# Patient Record
Sex: MALE | Race: WHITE | NOT HISPANIC OR LATINO | Employment: OTHER | URBAN - METROPOLITAN AREA
[De-identification: names, ages, dates, MRNs, and addresses within clinical notes are randomized per-mention and may not be internally consistent; named-entity substitution may affect disease eponyms.]

---

## 2023-06-09 ENCOUNTER — TELEPHONE (OUTPATIENT)
Dept: PRIMARY CARE | Facility: CLINIC | Age: 78
End: 2023-06-09
Payer: MEDICARE

## 2023-07-30 ENCOUNTER — HOSPITAL ENCOUNTER (OUTPATIENT)
Dept: DATA CONVERSION | Facility: HOSPITAL | Age: 78
Discharge: HOME | End: 2023-07-30
Attending: EMERGENCY MEDICINE

## 2023-07-30 DIAGNOSIS — Z79.01 LONG TERM (CURRENT) USE OF ANTICOAGULANTS: ICD-10-CM
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
## 2023-07-30 DIAGNOSIS — S69.91XA UNSPECIFIED INJURY OF RIGHT WRIST, HAND AND FINGER(S), INITIAL ENCOUNTER: ICD-10-CM

## 2023-07-30 DIAGNOSIS — Z23 ENCOUNTER FOR IMMUNIZATION: ICD-10-CM

## 2023-07-30 DIAGNOSIS — M25.531 PAIN IN RIGHT WRIST: ICD-10-CM

## 2023-07-30 DIAGNOSIS — S09.90XA UNSPECIFIED INJURY OF HEAD, INITIAL ENCOUNTER: ICD-10-CM

## 2023-07-30 DIAGNOSIS — S63.501A UNSPECIFIED SPRAIN OF RIGHT WRIST, INITIAL ENCOUNTER: Primary | ICD-10-CM

## 2023-07-30 DIAGNOSIS — I48.91 UNSPECIFIED ATRIAL FIBRILLATION (MULTI): ICD-10-CM

## 2023-07-30 LAB
ANION GAP SERPL CALCULATED.3IONS-SCNC: 10 MMOL/L (ref 0–19)
BASOPHILS # BLD AUTO: 0.06 K/UL (ref 0–0.22)
BASOPHILS NFR BLD AUTO: 0.6 % (ref 0–1)
BUN SERPL-MCNC: 18 MG/DL (ref 8–25)
BUN/CREAT SERPL: 20 RATIO (ref 8–21)
CALCIUM SERPL-MCNC: 8.6 MG/DL (ref 8.5–10.4)
CHLORIDE SERPL-SCNC: 105 MMOL/L (ref 97–107)
CO2 SERPL-SCNC: 24 MMOL/L (ref 24–31)
CREAT SERPL-MCNC: 0.9 MG/DL (ref 0.4–1.6)
DEPRECATED RDW RBC AUTO: 48.2 FL (ref 37–54)
DIFFERENTIAL METHOD BLD: ABNORMAL
EOSINOPHIL # BLD AUTO: 0.01 K/UL (ref 0–0.45)
EOSINOPHIL NFR BLD: 0.1 % (ref 0–3)
ERYTHROCYTE [DISTWIDTH] IN BLOOD BY AUTOMATED COUNT: 13.7 % (ref 11.7–15)
GFR SERPL CREATININE-BSD FRML MDRD: 89 ML/MIN/1.73 M2
GLUCOSE SERPL-MCNC: 207 MG/DL (ref 65–99)
HCT VFR BLD AUTO: 41.4 % (ref 41–50)
HGB BLD-MCNC: 14 GM/DL (ref 13.5–16.5)
IMM GRANULOCYTES # BLD AUTO: 0.04 K/UL (ref 0–0.1)
LYMPHOCYTES # BLD AUTO: 1.15 K/UL (ref 1.2–3.2)
LYMPHOCYTES NFR BLD MANUAL: 10.8 % (ref 20–40)
MCH RBC QN AUTO: 32.3 PG (ref 26–34)
MCHC RBC AUTO-ENTMCNC: 33.8 % (ref 31–37)
MCV RBC AUTO: 95.6 FL (ref 80–100)
MONOCYTES # BLD AUTO: 0.68 K/UL (ref 0–0.8)
MONOCYTES NFR BLD MANUAL: 6.4 % (ref 0–8)
NEUTROPHILS # BLD AUTO: 8.72 K/UL
NEUTROPHILS # BLD AUTO: 8.72 K/UL (ref 1.8–7.7)
NEUTROPHILS.IMMATURE NFR BLD: 0.4 % (ref 0–1)
NEUTS SEG NFR BLD: 81.7 % (ref 50–70)
NRBC BLD-RTO: 0 /100 WBC
PLATELET # BLD AUTO: 142 K/UL (ref 150–450)
PMV BLD AUTO: 11.1 CU (ref 7–12.6)
POTASSIUM SERPL-SCNC: 4.5 MMOL/L (ref 3.4–5.1)
RBC # BLD AUTO: 4.33 M/UL (ref 4.5–5.5)
SODIUM SERPL-SCNC: 139 MMOL/L (ref 133–145)
WBC # BLD AUTO: 10.7 K/UL (ref 4.5–11)

## 2023-09-05 ENCOUNTER — OFFICE VISIT (OUTPATIENT)
Dept: PRIMARY CARE | Facility: CLINIC | Age: 78
End: 2023-09-05
Payer: MEDICARE

## 2023-09-05 VITALS
HEIGHT: 72 IN | SYSTOLIC BLOOD PRESSURE: 120 MMHG | OXYGEN SATURATION: 96 % | HEART RATE: 77 BPM | DIASTOLIC BLOOD PRESSURE: 78 MMHG | BODY MASS INDEX: 29.93 KG/M2 | WEIGHT: 221 LBS

## 2023-09-05 DIAGNOSIS — M16.10 PRIMARY OSTEOARTHRITIS OF HIP, UNSPECIFIED LATERALITY: ICD-10-CM

## 2023-09-05 DIAGNOSIS — R29.6 FREQUENT FALLS: ICD-10-CM

## 2023-09-05 DIAGNOSIS — E85.9 AMYLOIDOSIS, UNSPECIFIED TYPE (MULTI): ICD-10-CM

## 2023-09-05 DIAGNOSIS — C43.39 MALIGNANT MELANOMA OF OTHER PARTS OF FACE (MULTI): ICD-10-CM

## 2023-09-05 DIAGNOSIS — I95.1 ORTHOSTATIC HYPOTENSION: ICD-10-CM

## 2023-09-05 DIAGNOSIS — F01.A0 MILD VASCULAR DEMENTIA WITHOUT BEHAVIORAL DISTURBANCE, PSYCHOTIC DISTURBANCE, MOOD DISTURBANCE, OR ANXIETY (MULTI): ICD-10-CM

## 2023-09-05 DIAGNOSIS — R53.81 PHYSICAL DECONDITIONING: Primary | ICD-10-CM

## 2023-09-05 DIAGNOSIS — R41.3 MEMORY CHANGES: ICD-10-CM

## 2023-09-05 PROBLEM — E27.1 ADDISON DISEASE (MULTI): Status: ACTIVE | Noted: 2023-09-05

## 2023-09-05 PROBLEM — E27.1 ADDISON DISEASE (MULTI): Status: RESOLVED | Noted: 2023-09-05 | Resolved: 2023-09-05

## 2023-09-05 PROCEDURE — 1160F RVW MEDS BY RX/DR IN RCRD: CPT | Performed by: STUDENT IN AN ORGANIZED HEALTH CARE EDUCATION/TRAINING PROGRAM

## 2023-09-05 PROCEDURE — 96372 THER/PROPH/DIAG INJ SC/IM: CPT | Performed by: STUDENT IN AN ORGANIZED HEALTH CARE EDUCATION/TRAINING PROGRAM

## 2023-09-05 PROCEDURE — 1157F ADVNC CARE PLAN IN RCRD: CPT | Performed by: STUDENT IN AN ORGANIZED HEALTH CARE EDUCATION/TRAINING PROGRAM

## 2023-09-05 PROCEDURE — 1159F MED LIST DOCD IN RCRD: CPT | Performed by: STUDENT IN AN ORGANIZED HEALTH CARE EDUCATION/TRAINING PROGRAM

## 2023-09-05 PROCEDURE — 1036F TOBACCO NON-USER: CPT | Performed by: STUDENT IN AN ORGANIZED HEALTH CARE EDUCATION/TRAINING PROGRAM

## 2023-09-05 PROCEDURE — 99214 OFFICE O/P EST MOD 30 MIN: CPT | Performed by: STUDENT IN AN ORGANIZED HEALTH CARE EDUCATION/TRAINING PROGRAM

## 2023-09-05 PROCEDURE — 1126F AMNT PAIN NOTED NONE PRSNT: CPT | Performed by: STUDENT IN AN ORGANIZED HEALTH CARE EDUCATION/TRAINING PROGRAM

## 2023-09-05 RX ORDER — MONTELUKAST SODIUM 4 MG/1
TABLET, CHEWABLE ORAL 2 TIMES DAILY
COMMUNITY

## 2023-09-05 RX ORDER — VENLAFAXINE 37.5 MG/1
75 TABLET ORAL DAILY
COMMUNITY

## 2023-09-05 RX ORDER — EZETIMIBE 10 MG/1
1 TABLET ORAL DAILY
COMMUNITY
Start: 2021-11-02

## 2023-09-05 RX ORDER — NAPROXEN 500 MG/1
TABLET ORAL
COMMUNITY

## 2023-09-05 RX ORDER — DONEPEZIL HYDROCHLORIDE 5 MG/1
5 TABLET, FILM COATED ORAL NIGHTLY
Qty: 30 TABLET | Refills: 5 | Status: SHIPPED | OUTPATIENT
Start: 2023-09-05 | End: 2024-03-01

## 2023-09-05 RX ORDER — CEPHALEXIN 500 MG/1
TABLET ORAL
COMMUNITY

## 2023-09-05 RX ORDER — FLUDROCORTISONE ACETATE 0.1 MG/1
1 TABLET ORAL
COMMUNITY
Start: 2022-08-18

## 2023-09-05 RX ORDER — METOPROLOL TARTRATE 25 MG/1
1 TABLET, FILM COATED ORAL DAILY
COMMUNITY
Start: 2022-06-24

## 2023-09-05 RX ORDER — TRIAMCINOLONE ACETONIDE 40 MG/ML
40 INJECTION, SUSPENSION INTRA-ARTICULAR; INTRAMUSCULAR ONCE
Status: COMPLETED | OUTPATIENT
Start: 2023-09-05 | End: 2023-09-05

## 2023-09-05 RX ADMIN — TRIAMCINOLONE ACETONIDE 40 MG: 40 INJECTION, SUSPENSION INTRA-ARTICULAR; INTRAMUSCULAR at 14:13

## 2023-09-05 ASSESSMENT — PATIENT HEALTH QUESTIONNAIRE - PHQ9
2. FEELING DOWN, DEPRESSED OR HOPELESS: NOT AT ALL
1. LITTLE INTEREST OR PLEASURE IN DOING THINGS: NOT AT ALL
SUM OF ALL RESPONSES TO PHQ9 QUESTIONS 1 AND 2: 0

## 2023-09-05 ASSESSMENT — ENCOUNTER SYMPTOMS
GASTROINTESTINAL NEGATIVE: 1
PALPITATIONS: 0
ARTHRALGIAS: 1
SLEEP DISTURBANCE: 0
FATIGUE: 1
RESPIRATORY NEGATIVE: 1

## 2023-09-05 NOTE — PROGRESS NOTES
Subjective   Patient ID: Ciaran Maciel Jr is a 78 y.o. male who presents for Follow-up (Follow up visit/Requesting kenalog shot in hip).    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 Supposed to follow up for yearly image screening but no longer interested.    Recent mechanical fall 7/23, went to ER and no concerning findings. Would like PT referral as wife feels deconditioning playing a role.    Orthostatic hypotension- on florinef, stable and BP have been doing well at home 100-140s. Addisons disease ruled out by endocrine    Review of Systems   Constitutional:  Positive for fatigue.   HENT: Negative.     Eyes:  Negative for visual disturbance.   Respiratory: Negative.     Cardiovascular:  Negative for chest pain and palpitations.   Gastrointestinal: Negative.    Genitourinary: Negative.    Musculoskeletal:  Positive for arthralgias and gait problem.   Psychiatric/Behavioral:  Negative for sleep disturbance.    All other systems reviewed and are negative.      Objective   /78 (BP Location: Left arm, Patient Position: Sitting, BP Cuff Size: Adult)   Pulse 77   Ht 1.829 m (6')   Wt 100 kg (221 lb)   SpO2 96%   BMI 29.97 kg/m²     Physical Exam  Vitals reviewed.   Constitutional:       Appearance: Normal appearance.   HENT:      Head: Normocephalic.      Mouth/Throat:      Mouth: Mucous membranes are moist.   Eyes:      Pupils: Pupils are equal, round, and reactive to light.   Cardiovascular:      Rate and Rhythm: Rhythm irregular.   Pulmonary:      Effort: Pulmonary effort is normal. No respiratory distress.      Breath sounds: Normal breath sounds. No wheezing or rhonchi.   Abdominal:      Palpations: Abdomen is soft.   Musculoskeletal:         General: No tenderness. Normal range of motion.      Right lower leg: No edema.      Left lower leg: No edema.    Skin:     General: Skin is warm and dry.   Neurological:      General: No focal deficit present.      Mental Status: He is alert. Mental status is at baseline.   Psychiatric:         Mood and Affect: Mood normal.         Behavior: Behavior normal.           Current Outpatient Medications:     cephalexin (Keftab) 500 mg tablet, Take by mouth. For Dental procedures only, Disp: , Rfl:     colestipol (Colestid) 1 gram tablet, Take by mouth twice a day., Disp: , Rfl:     ezetimibe (Zetia) 10 mg tablet, Take 1 tablet (10 mg) by mouth once daily., Disp: , Rfl:     fludrocortisone (Florinef) 0.1 mg tablet, Take 1 tablet (0.1 mg) by mouth once daily in the morning. Take before meals., Disp: , Rfl:     metoprolol tartrate (Lopressor) 25 mg tablet, Take 1 tablet (25 mg) by mouth once daily., Disp: , Rfl:     rivaroxaban (Xarelto) 20 mg tablet, Take 1 tablet (20 mg) by mouth once daily., Disp: , Rfl:     venlafaxine (Effexor) 37.5 mg tablet, Take 2 tablets (75 mg) by mouth once daily., Disp: , Rfl:     donepezil (Aricept) 5 mg tablet, Take 1 tablet (5 mg) by mouth once daily at bedtime., Disp: 30 tablet, Rfl: 5    naproxen (Naprosyn) 500 mg tablet, , Disp: , Rfl:   No current facility-administered medications for this visit.      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  Primary osteoarthritis of hip, unspecified laterality  Comments:  40mg kenolog givinay IM in office  Orders:  -     triamcinolone acetonide (Kenalog-40) injection 40 mg  Mild vascular dementia without behavioral disturbance, psychotic disturbance, mood disturbance, or anxiety  (CMS/HCC)  Comments:  controlling risk factors  start low dose aricept  Amyloidosis, unspecified type (CMS/HCC)  Comments:  declined prior cardiac biopsy  was told no options for treatment due to cardiac location  Orthostatic hypotension  Comments:  well controlled on fludricortisone  Assessment and plan to be discussed with attending.   Helen Abdul, DO PGY-2    Follow up in 6 months    I saw and evaluated the patient. I personally obtained the key and critical portions of the history and physical exam or was physically present for key and critical portions performed by the trainee. I reviewed the trainee's documentation and discussed the patient with the trainee. I agree with the trainee's medical decision making, as documented on the trainee's note.      Gerri Sharma, DO

## 2023-09-22 PROBLEM — N52.9 MALE ERECTILE DISORDER: Status: ACTIVE | Noted: 2023-09-22

## 2023-09-22 PROBLEM — I10 BENIGN ESSENTIAL HYPERTENSION: Status: ACTIVE | Noted: 2023-09-22

## 2023-09-22 PROBLEM — M17.9 DJD (DEGENERATIVE JOINT DISEASE) OF KNEE: Status: ACTIVE | Noted: 2023-09-22

## 2023-09-22 PROBLEM — H40.013 OPEN ANGLE WITH BORDERLINE FINDINGS, LOW RISK, BILATERAL: Status: ACTIVE | Noted: 2023-09-22

## 2023-09-22 PROBLEM — R97.20 ELEVATED PSA: Status: ACTIVE | Noted: 2023-09-22

## 2023-09-22 PROBLEM — F43.10 PTSD (POST-TRAUMATIC STRESS DISORDER): Status: ACTIVE | Noted: 2023-09-22

## 2023-09-22 PROBLEM — H52.7 DISORDER OF REFRACTION: Status: ACTIVE | Noted: 2023-09-22

## 2023-09-22 PROBLEM — G47.00 INSOMNIA: Status: ACTIVE | Noted: 2023-09-22

## 2023-09-22 PROBLEM — H25.13 AGE-RELATED NUCLEAR CATARACT, BILATERAL: Status: ACTIVE | Noted: 2023-09-22

## 2023-09-22 PROBLEM — M65.30 ACQUIRED TRIGGER FINGER: Status: ACTIVE | Noted: 2023-09-22

## 2023-09-22 PROBLEM — E66.9 OBESITY (BMI 30-39.9): Status: ACTIVE | Noted: 2023-09-22

## 2023-09-22 PROBLEM — E78.5 HYPERLIPIDEMIA: Status: ACTIVE | Noted: 2023-09-22

## 2023-09-22 PROBLEM — G56.02 CARPAL TUNNEL SYNDROME OF LEFT WRIST: Status: ACTIVE | Noted: 2023-09-22

## 2023-09-22 PROBLEM — I95.1 ORTHOSTATIC SYNCOPE: Status: ACTIVE | Noted: 2023-09-22

## 2023-09-22 PROBLEM — I48.91 ATRIAL FIBRILLATION (MULTI): Status: ACTIVE | Noted: 2023-09-22

## 2023-09-22 PROBLEM — C43.22: Status: ACTIVE | Noted: 2023-09-22

## 2023-09-22 PROBLEM — R40.20 LOSS OF CONSCIOUSNESS (MULTI): Status: ACTIVE | Noted: 2023-09-22

## 2023-09-22 PROBLEM — E55.9 VITAMIN D DEFICIENCY: Status: ACTIVE | Noted: 2023-09-22

## 2023-09-22 PROBLEM — S06.6X9A SUBARACHNOID HEMORRHAGE FOLLOWING INJURY, WITH LOSS OF CONSCIOUSNESS (MULTI): Status: ACTIVE | Noted: 2023-09-22

## 2023-09-22 PROBLEM — I25.10 CAD (CORONARY ARTERY DISEASE): Status: ACTIVE | Noted: 2023-09-22

## 2023-09-22 PROBLEM — I63.9 CVA (CEREBRAL VASCULAR ACCIDENT) (MULTI): Status: ACTIVE | Noted: 2023-09-22

## 2023-09-22 RX ORDER — ASPIRIN 325 MG
325 TABLET ORAL
COMMUNITY

## 2023-09-22 RX ORDER — CLOPIDOGREL BISULFATE 75 MG/1
75 TABLET ORAL
COMMUNITY

## 2023-09-22 RX ORDER — NAPROXEN SODIUM 220 MG/1
3 TABLET ORAL DAILY
COMMUNITY
Start: 2017-05-22

## 2023-09-22 RX ORDER — ACETAMINOPHEN 500 MG
2 TABLET ORAL EVERY 8 HOURS PRN
COMMUNITY

## 2023-09-22 RX ORDER — DOXAZOSIN 4 MG/1
8 TABLET ORAL DAILY
COMMUNITY
Start: 2010-06-11

## 2023-09-22 RX ORDER — CETIRIZINE HYDROCHLORIDE 10 MG/1
1 TABLET ORAL DAILY
COMMUNITY
Start: 2006-10-31

## 2023-09-22 RX ORDER — CHOLECALCIFEROL (VITAMIN D3) 1250 MCG
TABLET ORAL
COMMUNITY

## 2023-09-22 RX ORDER — CHOLECALCIFEROL (VITAMIN D3) 25 MCG
1 TABLET ORAL DAILY
COMMUNITY
Start: 2017-05-22

## 2023-09-22 RX ORDER — MULTIVITAMIN
1 TABLET ORAL DAILY
COMMUNITY
Start: 2010-06-11

## 2023-10-10 ENCOUNTER — EVALUATION (OUTPATIENT)
Dept: PHYSICAL THERAPY | Facility: CLINIC | Age: 78
End: 2023-10-10
Payer: MEDICARE

## 2023-10-10 DIAGNOSIS — R29.6 FREQUENT FALLS: ICD-10-CM

## 2023-10-10 DIAGNOSIS — R53.81 PHYSICAL DECONDITIONING: ICD-10-CM

## 2023-10-10 PROCEDURE — 97161 PT EVAL LOW COMPLEX 20 MIN: CPT | Mod: GP

## 2023-10-10 NOTE — Clinical Note
October 11, 2023     Patient: Ciaran Maciel Jr   YOB: 1945   Date of Visit: 10/10/2023       To Whom it May Concern:    Ciaran Maciel Jr was seen in my clinic on 10/10/2023. He {Return to school/sport:70014}.    If you have any questions or concerns, please don't hesitate to call.         Sincerely,          Loni Mayfield, PT        CC: No Recipients

## 2023-10-10 NOTE — Clinical Note
October 11, 2023     Patient: Ciaran Maciel    YOB: 1945   Date of Visit: 10/10/2023       To Whom It May Concern:    It is my medical opinion that Ciaran Maciel Jr {Work release (duty restriction):56679}.    If you have any questions or concerns, please don't hesitate to call.         Sincerely,        Loni Mayfield, PT    CC: No Recipients

## 2023-10-11 ASSESSMENT — ENCOUNTER SYMPTOMS
DEPRESSION: 0
OCCASIONAL FEELINGS OF UNSTEADINESS: 1
LOSS OF SENSATION IN FEET: 0

## 2023-10-11 NOTE — PROGRESS NOTES
Physical Therapy    Physical Therapy Evaluation    Patient Name: Ciaran Maciel Jr  MRN: [id number]  Today's Date: 10/11/2023       Assessment  PT Assessment Results: Decreased strength, Decreased range of motion, Decreased endurance, Impaired balance, Decreased coordination, Decreased mobility  Rehab Prognosis: Good    pt is a 78 year old M who participated in a physical therapy evaluation today due to Balance, vestibular impairments. His impairments include; balance deficits,  motor control]. Due to these impairments, pt has the following functional limitations  Gait deviations, increased fall risk, difficulty with sleeping, stair negotiation, transfers, performing household/recreational/ occupational activities, and performing ADLs]. Pt will benefit from continued skilled physical therapy to address above impairments and progress toward therapy related goals.       Plan  Treatment/Interventions: Education/ Instruction, Gait training, Manual therapy, Neuromuscular re-education, Therapeutic activities, Therapeutic exercises  PT Plan: Skilled PT  PT Frequency: 1 time per week  Duration: 4 weeks  Onset Date: 10/20/22  Certification Period Start Date: 10/10/23  Certification Period End Date: 01/09/24  Number of Treatments Authorized: [place]  Rehab Potential: Good  Plan of Care Agreement: Patient      Next visit; Assessment for further vestibular hypofunction,       Subjective   General:  General  Reason for Referral: pt reports that he has a hard time with balance, getting up from chairs. . pt reports that they have recently downsized to a smaller home without stairs. Most recent fall- July lead to bruising. pt notes that he is a history of orthostatic hypotension and is taking BP medication. pt notes that he has a long history of vestibular issues as well, and willl need further PT evaluation to help assess for a vestibular hypofunction.  Referred By: Dr Sharma       Prior Function Per Pt/Caregiver Report:  Level of  [place]: Independent with ADLs and functional transfers    Objective        Strength:  Strength Comments: B/L LE 4/5       Stairs:  Stairs Comment: step to pattern     Transfers:  Transfers Comment: requires B/L UE assist to help with sit to stand ransfer  Other:       Outcome Measures:  Other Measures  5x Sit to Stand: 15 seconds  Other Outcome Measures: 4 stage balance   Narrow stance; WFL  Semi tandem: WFL with mild LOB  Tandem: 5, 4 seconds   SL: unable to perform  OP EDUCATION:  Education  Individual(s) Educated: Patient  Education Provided: Anatomy, Fall Risk, Physiology    Goals:  Encounter Problems       Encounter Problems (Active)       PT Problem       PT Goal 1       Start:  10/11/23            PT Goal 2       Start:  10/11/23            PT Goal 3       Start:  10/11/23            PT Goal 4       Start:  10/11/23

## 2023-10-23 ENCOUNTER — TREATMENT (OUTPATIENT)
Dept: PHYSICAL THERAPY | Facility: CLINIC | Age: 78
End: 2023-10-23
Payer: MEDICARE

## 2023-10-23 DIAGNOSIS — R29.6 FREQUENT FALLS: ICD-10-CM

## 2023-10-23 PROCEDURE — 97112 NEUROMUSCULAR REEDUCATION: CPT | Mod: GP | Performed by: PHYSICAL THERAPIST

## 2023-10-23 NOTE — PROGRESS NOTES
"Physical Therapy Treatment    Patient Name: Ciaran Maciel Jr  MRN: [id number]  Today's Date: 10/23/2023  Time Calculation  Start Time: 1135  Stop Time: 1205  Time Calculation (min): 30 min  PT Therapeutic Procedures Time Entry  Neuromuscular Re-Education Time Entry: 30  Visit # 1/4    Current Problem   1. Frequent falls  Follow Up In Physical Therapy          Subjective   General   General Comment: Pt doesnot report any recent falls, most recent in July, he does feel that he is stumbling around and catching himself on objects. He does have a history of stroke, most recent in 2019, since than whenever he looks up he will pass out.  Precautions:  Precautions  Precautions Comment: Falls  Pain    0  Post Treatment Pain Level 0    Objective   Findings: Able to hold tandem and SLS within functional limits without constant need of UE  EC firm surface noted slight shift posterior no LOB     Treatments:  Balance/Neuromuscular Re-Education  Balance/Neuromuscular Re-Education Activity Performed: Yes (Access Code [email or web address])  Balance/Neuromuscular Re-Education Activity 1: NuStep warm up 4' L3  Balance/Neuromuscular Re-Education Activity 2: firm surface 30\" EO; 30\"x3 EC  Balance/Neuromuscular Re-Education Activity 3: firm surface EO with head turns 10x3; EC head turns 10x3  Balance/Neuromuscular Re-Education Activity 4: tandem firm surface 15\" 3x B  Balance/Neuromuscular Re-Education Activity 5: SLS 5\"x2 B UE PRN  Balance/Neuromuscular Re-Education Activity 6: sit to stand 5x2  Balance/Neuromuscular Re-Education Activity 7: low derian fwd/bwd step over 10x2 no UE       Assessment   Assessment:   PT Assessment  Assessment Comment: Pt tolerated session well focus on general balance exercise with good balance noted, no LOB this date. Will continue with progression as tolerated.    Plan:    Continue with balance and strength progression      Goals:   Active       PT Problem       pt will demonstrate 5x sit to  less than " 15 seconds       Start:  10/11/23    Expected End:  01/09/24            pt will demonstrate SLS for > 10 seconds       Start:  10/11/23    Expected End:  01/09/24            pt will demonstrate B/L LE strength 4+/5        Start:  10/11/23    Expected End:  01/09/24            pt will be indep with HEP       Start:  10/11/23    Expected End:  01/09/24

## 2023-10-30 ENCOUNTER — TREATMENT (OUTPATIENT)
Dept: PHYSICAL THERAPY | Facility: CLINIC | Age: 78
End: 2023-10-30
Payer: MEDICARE

## 2023-10-30 DIAGNOSIS — R29.6 FREQUENT FALLS: ICD-10-CM

## 2023-10-30 PROCEDURE — 97112 NEUROMUSCULAR REEDUCATION: CPT | Mod: GP | Performed by: PHYSICAL THERAPIST

## 2023-10-30 ASSESSMENT — PAIN - FUNCTIONAL ASSESSMENT: PAIN_FUNCTIONAL_ASSESSMENT: 0-10

## 2023-10-30 ASSESSMENT — PAIN SCALES - GENERAL: PAINLEVEL_OUTOF10: 0 - NO PAIN

## 2023-10-30 NOTE — PROGRESS NOTES
"Physical Therapy Treatment    Patient Name: Ciaran Maciel Jr  MRN: [id number]  Today's Date: 10/30/2023  Time Calculation  Start Time: 1452  Stop Time: 1530  Time Calculation (min): 38 min  PT Therapeutic Procedures Time Entry  Neuromuscular Re-Education Time Entry: 38  Visit # 2/4    Current Problem   1. Frequent falls  Follow Up In Physical Therapy          Subjective   General   General Comment: Pt does not report any large change. No falls or LOB since last session.  Precautions:  Precautions  Precautions Comment: falls  Pain   Pain Assessment: 0-10  Pain Score: 0 - No pain  Post Treatment Pain Level 0    Objective   Findings: Vestibular screen -   Smooth pursuit: normal findings however slight report of dizziness   Saccades: H +symptoms with slight overshoot; V + symptoms   VOR: H no abnormal findings, slight dizziness however not as bad as smooth pursuit or saccades; V difficulty maintaining gaze stabilization with symptoms     Treatments:  Balance/Neuromuscular Re-Education  Balance/Neuromuscular Re-Education Activity Performed: Yes (Access Code [email or web address])  Balance/Neuromuscular Re-Education Activity 1: NuStep L5 5' no UE  Balance/Neuromuscular Re-Education Activity 2: toe tap @ step 10x3  Balance/Neuromuscular Re-Education Activity 3: squats on airex pad 10x3 no UE  Balance/Neuromuscular Re-Education Activity 4: foam surface romberg EC 30\"x3 no UE  Balance/Neuromuscular Re-Education Activity 5: seated smooth pursuit PB H and V 30\"x3 each  Balance/Neuromuscular Re-Education Activity 6: seated saccades H & V 30\"x3       Assessment   Assessment:   PT Assessment  Assessment Comment: Pt tolerated session well, screen of vestibular system with findings of CNS involvement most liekly due to prior strokes, began vestibular rehab in seated position. Will continue as tolerated.    Plan:    Progress with vestibular system      Goals:   Active       PT Problem       pt will demonstrate 5x sit to  less than " 15 seconds       Start:  10/11/23    Expected End:  01/09/24            pt will demonstrate SLS for > 10 seconds       Start:  10/11/23    Expected End:  01/09/24            pt will demonstrate B/L LE strength 4+/5        Start:  10/11/23    Expected End:  01/09/24            pt will be indep with HEP       Start:  10/11/23    Expected End:  01/09/24

## 2023-11-06 ENCOUNTER — TREATMENT (OUTPATIENT)
Dept: PHYSICAL THERAPY | Facility: CLINIC | Age: 78
End: 2023-11-06
Payer: MEDICARE

## 2023-11-06 DIAGNOSIS — R29.6 FREQUENT FALLS: ICD-10-CM

## 2023-11-06 PROCEDURE — 97112 NEUROMUSCULAR REEDUCATION: CPT | Mod: GP | Performed by: PHYSICAL THERAPIST

## 2023-11-06 NOTE — PROGRESS NOTES
"Physical Therapy Treatment    Patient Name: Ciaran Maciel Jr  MRN: [id number]  Today's Date: 11/6/2023  Time Calculation  Start Time: 0925  Stop Time: 1003  Time Calculation (min): 38 min  PT Therapeutic Procedures Time Entry  Neuromuscular Re-Education Time Entry: 38  Visit # 3/4    Current Problem   1. Frequent falls  Follow Up In Physical Therapy          Subjective   General   General Comment: Pt does not report any new compalints, mod compliance with HEP.  Precautions:  Precautions  Precautions Comment: Falls  Pain    0  Post Treatment Pain Level 0    Objective   Findings: reduced gaze stabilization with smooth pursuit and VORx2 H; V WNL this date     Treatments:  Balance/Neuromuscular Re-Education  Balance/Neuromuscular Re-Education Activity Performed: Yes (Access Code [email or web address])  Balance/Neuromuscular Re-Education Activity 1: seated smooth pursuit PB H and V 30\"x3 each  Balance/Neuromuscular Re-Education Activity 2: seated saccades H 30\"x3  Balance/Neuromuscular Re-Education Activity 3: Seated VOR PB V and H 30\"x3  Balance/Neuromuscular Re-Education Activity 4: seated VORx2 PB H 30\"x3       Assessment   Assessment:   PT Assessment  Assessment Comment: Pt performed progression of seated vestibular noted reduciton in horizontal tasks with reduction in gaze stabilization. Will continue with progression sa tolerated.    Plan:    Continue progression of balance and vestibular     OP EDUCATION:   Continuation of HEP    Goals:   Active       PT Problem       pt will demonstrate 5x sit to  less than 15 seconds (Progressing)       Start:  10/11/23    Expected End:  01/09/24            pt will demonstrate SLS for > 10 seconds (Progressing)       Start:  10/11/23    Expected End:  01/09/24            pt will demonstrate B/L LE strength 4+/5  (Progressing)       Start:  10/11/23    Expected End:  01/09/24            pt will be indep with HEP (Progressing)       Start:  10/11/23    Expected End:  01/09/24    "

## 2023-11-13 ENCOUNTER — APPOINTMENT (OUTPATIENT)
Dept: PHYSICAL THERAPY | Facility: CLINIC | Age: 78
End: 2023-11-13
Payer: MEDICARE

## 2023-11-27 ENCOUNTER — TREATMENT (OUTPATIENT)
Dept: PHYSICAL THERAPY | Facility: CLINIC | Age: 78
End: 2023-11-27
Payer: MEDICARE

## 2023-11-27 DIAGNOSIS — R29.6 FREQUENT FALLS: ICD-10-CM

## 2023-11-27 PROCEDURE — 97112 NEUROMUSCULAR REEDUCATION: CPT | Mod: GP | Performed by: PHYSICAL THERAPIST

## 2023-11-27 ASSESSMENT — PAIN SCALES - GENERAL: PAINLEVEL_OUTOF10: 0 - NO PAIN

## 2023-11-27 ASSESSMENT — PAIN - FUNCTIONAL ASSESSMENT: PAIN_FUNCTIONAL_ASSESSMENT: 0-10

## 2023-11-27 NOTE — PROGRESS NOTES
"Physical Therapy Treatment    Patient Name: Ciaran Maciel Jr  MRN: [id number]  Today's Date: 11/27/2023  Time Calculation  Start Time: 1016  Stop Time: 1054  Time Calculation (min): 38 min  PT Therapeutic Procedures Time Entry  Neuromuscular Re-Education Time Entry: 38  Visit # 4/8    Current Problem   1. Frequent falls  Follow Up In Physical Therapy    Follow Up In Physical Therapy          Subjective   General   General Comment: Pt does not report any new changes, feels a little \"loopy\" today.  Precautions:  Precautions  Precautions Comment: Falls  Pain   Pain Assessment: 0-10  Pain Score: 0 - No pain  Post Treatment Pain Level 0    Objective   Posterior displacement on airex pad     Treatments:  Balance/Neuromuscular Re-Education  Balance/Neuromuscular Re-Education Activity Performed: Yes (Access Code [email or web address])  Balance/Neuromuscular Re-Education Activity 1: nustep warm up 5'  Balance/Neuromuscular Re-Education Activity 2: standing VORx2 BB H 30\"x3  Balance/Neuromuscular Re-Education Activity 3: standing diagonal VOR BB 30\"x3 B  Balance/Neuromuscular Re-Education Activity 4: airex pad normal stance EC 30\"x3 no UE  Balance/Neuromuscular Re-Education Activity 5: firm surface romberg EC with head turns 10x3  Balance/Neuromuscular Re-Education Activity 6: two target with EC 30\"x3       Assessment   Assessment:   PT Assessment  Assessment Comment: While patient has been making improvement he still has posterior displacement during foam surface exercise with only beginning to perform higher level balance tasks this date, pt and PT discussed findings and feel pt would benefit from continued skilled therapy in order to progress with balance tasks.    Plan:   OP PT Plan  PT Frequency: 1 time per week  Duration: 4 weeks    OP EDUCATION:  Outpatient Education  Education Comment: Continue with HEP    Goals:   Active       PT Problem       pt will demonstrate 5x sit to  less than 15 seconds (Progressing)       " Start:  10/11/23    Expected End:  01/09/24            pt will demonstrate SLS for > 10 seconds (Progressing)       Start:  10/11/23    Expected End:  01/09/24            pt will demonstrate B/L LE strength 4+/5  (Progressing)       Start:  10/11/23    Expected End:  01/09/24            pt will be indep with HEP (Progressing)       Start:  10/11/23    Expected End:  01/09/24

## 2023-12-08 ENCOUNTER — TREATMENT (OUTPATIENT)
Dept: PHYSICAL THERAPY | Facility: CLINIC | Age: 78
End: 2023-12-08
Payer: MEDICARE

## 2023-12-08 DIAGNOSIS — R29.6 FREQUENT FALLS: ICD-10-CM

## 2023-12-08 PROCEDURE — 97112 NEUROMUSCULAR REEDUCATION: CPT | Mod: GP | Performed by: PHYSICAL THERAPIST

## 2023-12-08 NOTE — PROGRESS NOTES
"Physical Therapy Treatment    Patient Name: Ciaran Maciel Jr  MRN: [id number]  Today's Date: 12/8/2023  Time Calculation  Start Time: 0840  Stop Time: 0920  Time Calculation (min): 40 min  PT Therapeutic Procedures Time Entry  Neuromuscular Re-Education Time Entry: 40  Visit # 5/8    Current Problem   1. Frequent falls  Follow Up In Physical Therapy          Subjective   General   General Comment: Pt reports he has been very bust at home and with family matters but does not report any issues with balance.  Precautions:  Precautions  Precautions Comment: Falls      Objective   Reduced sway with eyes closed on foam pad     Treatments:  Balance/Neuromuscular Re-Education  Balance/Neuromuscular Re-Education Activity Performed: Yes  Balance/Neuromuscular Re-Education Activity 1: nustep warm up 5'  Balance/Neuromuscular Re-Education Activity 2: airex pad normal stance EC 30\"x3 no UE  Balance/Neuromuscular Re-Education Activity 3: airex pad diagonal VORx1 B 30\"x3  Balance/Neuromuscular Re-Education Activity 4: airex pad smooth pursuit BB H 30\"x3  Balance/Neuromuscular Re-Education Activity 5: airex pad EC with head turns no UE 10x3       Assessment   Assessment:   PT Assessment  Assessment Comment: Pt demonstrated improved balance on airex pad, only one LOB posteriore with Susi, will continue as tolerated.    Plan:    Continue with higher level balance progression         Goals:   Active       PT Problem       pt will demonstrate 5x sit to  less than 15 seconds (Progressing)       Start:  10/11/23    Expected End:  01/09/24            pt will demonstrate SLS for > 10 seconds (Progressing)       Start:  10/11/23    Expected End:  01/09/24            pt will demonstrate B/L LE strength 4+/5  (Progressing)       Start:  10/11/23    Expected End:  01/09/24            pt will be indep with HEP (Progressing)       Start:  10/11/23    Expected End:  01/09/24                 "

## 2023-12-15 ENCOUNTER — TREATMENT (OUTPATIENT)
Dept: PHYSICAL THERAPY | Facility: CLINIC | Age: 78
End: 2023-12-15
Payer: MEDICARE

## 2023-12-15 DIAGNOSIS — R29.6 FREQUENT FALLS: ICD-10-CM

## 2023-12-15 PROCEDURE — 97112 NEUROMUSCULAR REEDUCATION: CPT | Mod: GP | Performed by: PHYSICAL THERAPIST

## 2023-12-15 ASSESSMENT — PAIN - FUNCTIONAL ASSESSMENT: PAIN_FUNCTIONAL_ASSESSMENT: 0-10

## 2023-12-15 ASSESSMENT — PAIN SCALES - GENERAL: PAINLEVEL_OUTOF10: 0 - NO PAIN

## 2023-12-15 NOTE — PROGRESS NOTES
Physical Therapy Treatment    Patient Name: Ciaran Maciel Jr  MRN: [id number]  Today's Date: 12/15/2023  Time Calculation  Start Time: 0850  Stop Time: 0929  Time Calculation (min): 39 min  PT Therapeutic Procedures Time Entry  Neuromuscular Re-Education Time Entry: 39  Visit # 6/8    Current Problem   1. Frequent falls  Follow Up In Physical Therapy          Subjective   General   General Comment: Pt does not report any new complaints from previous session.  Precautions:  Precautions  Precautions Comment: Falls  Pain   Pain Assessment: 0-10  Pain Score: 0 - No pain  Post Treatment Pain Level 0    Objective   No LOB increased sway with tandem     Treatments:  Balance/Neuromuscular Re-Education  Balance/Neuromuscular Re-Education Activity Performed: Yes  Balance/Neuromuscular Re-Education Activity 1: nustep warm up 8'  Balance/Neuromuscular Re-Education Activity 2: firm surface tandem R/L head turns 10x2 B  Balance/Neuromuscular Re-Education Activity 3: foam normal stance EC with head turns 10x3  Balance/Neuromuscular Re-Education Activity 4: airex beam fwd/bwd ambulation 10x  Balance/Neuromuscular Re-Education Activity 5: airex beam lateral steps 10x  Balance/Neuromuscular Re-Education Activity 6: low derian fwd/.bwd 10x       Assessment   Assessment:   PT Assessment  Assessment Comment: Pt tolerated sesion well, no LOB, improvement with higher level tasks. WIll continue as tolerated.    Plan:    Continue with balance tasks   OP EDUCATION:   EHP    Goals:   Active       PT Problem       pt will demonstrate 5x sit to  less than 15 seconds (Progressing)       Start:  10/11/23    Expected End:  01/09/24            pt will demonstrate SLS for > 10 seconds (Progressing)       Start:  10/11/23    Expected End:  01/09/24            pt will demonstrate B/L LE strength 4+/5  (Progressing)       Start:  10/11/23    Expected End:  01/09/24            pt will be indep with HEP (Progressing)       Start:  10/11/23     Expected End:  01/09/24

## 2023-12-18 ENCOUNTER — TREATMENT (OUTPATIENT)
Dept: PHYSICAL THERAPY | Facility: CLINIC | Age: 78
End: 2023-12-18
Payer: MEDICARE

## 2023-12-18 DIAGNOSIS — R29.6 FREQUENT FALLS: ICD-10-CM

## 2023-12-18 PROCEDURE — 97112 NEUROMUSCULAR REEDUCATION: CPT | Mod: GP | Performed by: PHYSICAL THERAPIST

## 2023-12-18 ASSESSMENT — PAIN - FUNCTIONAL ASSESSMENT: PAIN_FUNCTIONAL_ASSESSMENT: 0-10

## 2023-12-18 ASSESSMENT — PAIN SCALES - GENERAL: PAINLEVEL_OUTOF10: 0 - NO PAIN

## 2023-12-18 NOTE — PROGRESS NOTES
Physical Therapy Treatment    Patient Name: Ciaran Maciel Jr  MRN: [id number]  Today's Date: 12/18/2023  Time Calculation  Start Time: 1008  Stop Time: 1035  Time Calculation (min): 27 min  PT Therapeutic Procedures Time Entry  Neuromuscular Re-Education Time Entry: 27  Visit # 7/8    Current Problem   1. Frequent falls  Follow Up In Physical Therapy          Subjective   General   General Comment: Pt does not report any balance issues this date.  Precautions:  Precautions  Precautions Comment: Falls  Pain   Pain Assessment: 0-10  Pain Score: 0 - No pain  Post Treatment Pain Level 0    Objective   No LOB     Treatments:  Balance/Neuromuscular Re-Education  Balance/Neuromuscular Re-Education Activity Performed: Yes  Balance/Neuromuscular Re-Education Activity 1: nustep warm up 8'  Balance/Neuromuscular Re-Education Activity 2: low derian 2 reciprocal fwd/bwd 10x  UE PRN  Balance/Neuromuscular Re-Education Activity 3: low derian 2 lateral step through UE 10x  Balance/Neuromuscular Re-Education Activity 4: airex beam fwd/bwd ambulation 10x  Balance/Neuromuscular Re-Education Activity 5: cone tap 10x3 UE A       Assessment   Assessment:   PT Assessment  Assessment Comment: Pt tolerated session well focus on general balance techniques without any large concern, will perform reassess and potentially DC at next session.    Plan:    Reassess and potential DC next session       Goals:   Active       PT Problem       pt will demonstrate 5x sit to  less than 15 seconds (Progressing)       Start:  10/11/23    Expected End:  01/09/24            pt will demonstrate SLS for > 10 seconds (Progressing)       Start:  10/11/23    Expected End:  01/09/24            pt will demonstrate B/L LE strength 4+/5  (Progressing)       Start:  10/11/23    Expected End:  01/09/24            pt will be indep with HEP (Progressing)       Start:  10/11/23    Expected End:  01/09/24

## 2023-12-29 ENCOUNTER — TREATMENT (OUTPATIENT)
Dept: PHYSICAL THERAPY | Facility: CLINIC | Age: 78
End: 2023-12-29
Payer: MEDICARE

## 2023-12-29 DIAGNOSIS — R29.6 FREQUENT FALLS: ICD-10-CM

## 2023-12-29 PROCEDURE — 97530 THERAPEUTIC ACTIVITIES: CPT | Mod: GP | Performed by: PHYSICAL THERAPIST

## 2023-12-29 ASSESSMENT — BALANCE ASSESSMENTS
TURN 360 DEGREES: ABLE TO TURN 360 DEGREES SAFELY IN 4 SECONDS OR LESS
PLACE ALTERNATE FOOT ON STEP OR STOOL WHILE STANDING UNSUPPORTED: LOOKS BEHIND FROM BOTH SIDES AND WEIGHT SHIFTS WELL
SITTING WITH BACK UNSUPPORTED BUT FEET SUPPORTED ON FLOOR OR ON A STOOL: ABLE TO SIT SAFELY AND SECURELY FOR 2 MINUTES
REACHING FORWARD WITH OUTSTRETCHED ARM WHILE STANDING: CAN REACH FORWARD CONFIDENTLY 25 CM (10 INCHES)
LONG VERSION TOTAL SCORE (MAX 56): 56
STANDING UNSUPPORTED: ABLE TO STAND SAFELY FOR 2 MINUTES
STANDING ON ONE LEG: ABLE TO LIFT LEG INDEPENDENTLY AND HOLD GREATER THAN 10 SECONDS
STANDING UNSUPPORTED ONE FOOT IN FRONT: ABLE TO PLACE FOOT TANDEM INDEPENDENTLY AND HOLD 30 SECONDS
STANDING UNSUPPORTED WITH EYES CLOSED: ABLE TO STAND 10 SECONDS SAFELY
PICK UP OBJECT FROM THE FLOOR FROM A STANDING POSITION: ABLE TO PICK UP SLIPPER SAFELY AND EASILY
STANDING TO SITTING: SITS SAFELY WITH MINIMAL USE OF HANDS
PLACE ALTERNATE FOOT ON STEP OR STOOL WHILE STANDING UNSUPPORTED: ABLE TO STAND INDEPENDENTLY AND SAFELY AND COMPLETE 8 STEPS IN 20 SECONDS
STANDING UNSUPPORTED WITH FEET TOGETHER: ABLE TO PLACE FEET TOGETHER INDEPENDENTLY AND STAND 1 MINUTE SAFELY
TRANSFERS: ABLE TO TRANSFER SAFELY WITH MINOR USE OF HANDS
STANDING TO SITTING: ABLE TO STAND WITHOUT USING HANDS AND STABILIZE INDEPENDENTLY

## 2023-12-29 ASSESSMENT — PAIN SCALES - GENERAL: PAINLEVEL_OUTOF10: 0 - NO PAIN

## 2023-12-29 ASSESSMENT — PAIN - FUNCTIONAL ASSESSMENT: PAIN_FUNCTIONAL_ASSESSMENT: 0-10

## 2023-12-29 NOTE — PROGRESS NOTES
Physical Therapy Discharge    Patient Name: Ciaran Maciel Jr  MRN: [id number]  Today's Date: 12/29/2023  Time Calculation  Start Time: 0845  Stop Time: 0913  Time Calculation (min): 28 min  PT Therapeutic Procedures Time Entry  Therapeutic Activity Time Entry: 28  Visit Discharge    Current Problem   1. Frequent falls  Follow Up In Physical Therapy          Subjective   General   General Comment: Pt does not report any problems or deficits.  Precautions:  Precautions  Precautions Comment: None  Pain   Pain Assessment: 0-10  Pain Score: 0 - No pain  Post Treatment Pain Level 0    Objective   5x STS 13 seconds  TENORIO 56  Vestibular testing normal  Strength WNL    Treatments:  Therapeutic Activity  Therapeutic Activity Performed: Yes  Therapeutic Activity 1: NuStep warm up  Therapeutic Activity 2: reassessment see objective measures       Assessment   Assessment:   PT Assessment  Assessment Comment: Pt has met all goals without any remaining deficits, pt and PT in agreement for DC at this time.    Plan:    DC this date    OP EDUCATION:   Continue with established HEP     Goals:   Resolved       PT Problem       pt will demonstrate 5x sit to  less than 15 seconds (Met)       Start:  10/11/23    Expected End:  01/09/24    Resolved:  12/29/23         pt will demonstrate SLS for > 10 seconds (Met)       Start:  10/11/23    Expected End:  01/09/24    Resolved:  12/29/23         pt will demonstrate B/L LE strength 4+/5  (Met)       Start:  10/11/23    Expected End:  01/09/24    Resolved:  12/29/23         pt will be indep with HEP (Met)       Start:  10/11/23    Expected End:  01/09/24    Resolved:  12/29/23

## 2024-03-01 DIAGNOSIS — R41.3 MEMORY CHANGES: ICD-10-CM

## 2024-03-01 RX ORDER — DONEPEZIL HYDROCHLORIDE 5 MG/1
5 TABLET, FILM COATED ORAL NIGHTLY
Qty: 30 TABLET | Refills: 0 | Status: SHIPPED | OUTPATIENT
Start: 2024-03-01 | End: 2024-04-03

## 2024-03-05 ENCOUNTER — OFFICE VISIT (OUTPATIENT)
Dept: PRIMARY CARE | Facility: CLINIC | Age: 79
End: 2024-03-05
Payer: MEDICARE

## 2024-03-05 VITALS
SYSTOLIC BLOOD PRESSURE: 124 MMHG | BODY MASS INDEX: 30.75 KG/M2 | WEIGHT: 227 LBS | HEART RATE: 91 BPM | OXYGEN SATURATION: 91 % | DIASTOLIC BLOOD PRESSURE: 70 MMHG | HEIGHT: 72 IN

## 2024-03-05 DIAGNOSIS — R39.198 DIFFICULTY IN URINATION: ICD-10-CM

## 2024-03-05 DIAGNOSIS — Z12.5 PROSTATE CANCER SCREENING: ICD-10-CM

## 2024-03-05 DIAGNOSIS — N40.0 BENIGN PROSTATIC HYPERPLASIA WITHOUT LOWER URINARY TRACT SYMPTOMS: ICD-10-CM

## 2024-03-05 DIAGNOSIS — J31.0 CHRONIC RHINITIS: ICD-10-CM

## 2024-03-05 DIAGNOSIS — H61.21 HEARING LOSS OF RIGHT EAR DUE TO CERUMEN IMPACTION: ICD-10-CM

## 2024-03-05 DIAGNOSIS — Z00.00 MEDICARE ANNUAL WELLNESS VISIT, SUBSEQUENT: Primary | ICD-10-CM

## 2024-03-05 PROBLEM — D12.6 TUBULAR ADENOMA OF COLON: Status: ACTIVE | Noted: 2024-03-05

## 2024-03-05 PROBLEM — J30.2 SEASONAL ALLERGIES: Status: ACTIVE | Noted: 2024-03-05

## 2024-03-05 PROBLEM — F43.10 POST-TRAUMA SYNDROME: Status: ACTIVE | Noted: 2024-03-05

## 2024-03-05 PROBLEM — H93.93: Status: ACTIVE | Noted: 2024-03-05

## 2024-03-05 PROBLEM — J30.2 SEASONAL ALLERGIC RHINITIS: Status: ACTIVE | Noted: 2024-03-05

## 2024-03-05 LAB — PSA SERPL-MCNC: 2.65 NG/ML

## 2024-03-05 PROCEDURE — 3074F SYST BP LT 130 MM HG: CPT | Performed by: STUDENT IN AN ORGANIZED HEALTH CARE EDUCATION/TRAINING PROGRAM

## 2024-03-05 PROCEDURE — 1160F RVW MEDS BY RX/DR IN RCRD: CPT | Performed by: STUDENT IN AN ORGANIZED HEALTH CARE EDUCATION/TRAINING PROGRAM

## 2024-03-05 PROCEDURE — 1124F ACP DISCUSS-NO DSCNMKR DOCD: CPT | Performed by: STUDENT IN AN ORGANIZED HEALTH CARE EDUCATION/TRAINING PROGRAM

## 2024-03-05 PROCEDURE — 99214 OFFICE O/P EST MOD 30 MIN: CPT | Performed by: STUDENT IN AN ORGANIZED HEALTH CARE EDUCATION/TRAINING PROGRAM

## 2024-03-05 PROCEDURE — 1170F FXNL STATUS ASSESSED: CPT | Performed by: STUDENT IN AN ORGANIZED HEALTH CARE EDUCATION/TRAINING PROGRAM

## 2024-03-05 PROCEDURE — G0103 PSA SCREENING: HCPCS

## 2024-03-05 PROCEDURE — 1036F TOBACCO NON-USER: CPT | Performed by: STUDENT IN AN ORGANIZED HEALTH CARE EDUCATION/TRAINING PROGRAM

## 2024-03-05 PROCEDURE — 3078F DIAST BP <80 MM HG: CPT | Performed by: STUDENT IN AN ORGANIZED HEALTH CARE EDUCATION/TRAINING PROGRAM

## 2024-03-05 PROCEDURE — 96372 THER/PROPH/DIAG INJ SC/IM: CPT | Performed by: STUDENT IN AN ORGANIZED HEALTH CARE EDUCATION/TRAINING PROGRAM

## 2024-03-05 PROCEDURE — 1157F ADVNC CARE PLAN IN RCRD: CPT | Performed by: STUDENT IN AN ORGANIZED HEALTH CARE EDUCATION/TRAINING PROGRAM

## 2024-03-05 PROCEDURE — 36415 COLL VENOUS BLD VENIPUNCTURE: CPT

## 2024-03-05 PROCEDURE — 1159F MED LIST DOCD IN RCRD: CPT | Performed by: STUDENT IN AN ORGANIZED HEALTH CARE EDUCATION/TRAINING PROGRAM

## 2024-03-05 PROCEDURE — 1126F AMNT PAIN NOTED NONE PRSNT: CPT | Performed by: STUDENT IN AN ORGANIZED HEALTH CARE EDUCATION/TRAINING PROGRAM

## 2024-03-05 PROCEDURE — G0439 PPPS, SUBSEQ VISIT: HCPCS | Performed by: STUDENT IN AN ORGANIZED HEALTH CARE EDUCATION/TRAINING PROGRAM

## 2024-03-05 RX ORDER — TRIAMCINOLONE ACETONIDE 40 MG/ML
40 INJECTION, SUSPENSION INTRA-ARTICULAR; INTRAMUSCULAR ONCE
Status: COMPLETED | OUTPATIENT
Start: 2024-03-05 | End: 2024-03-05

## 2024-03-05 RX ORDER — TAMSULOSIN HYDROCHLORIDE 0.4 MG/1
0.4 CAPSULE ORAL DAILY
Qty: 90 CAPSULE | Refills: 1 | Status: SHIPPED | OUTPATIENT
Start: 2024-03-05 | End: 2025-03-05

## 2024-03-05 RX ADMIN — TRIAMCINOLONE ACETONIDE 40 MG: 40 INJECTION, SUSPENSION INTRA-ARTICULAR; INTRAMUSCULAR at 14:01

## 2024-03-05 ASSESSMENT — ENCOUNTER SYMPTOMS
HEMATURIA: 0
GASTROINTESTINAL NEGATIVE: 1
RHINORRHEA: 1
CARDIOVASCULAR NEGATIVE: 1
FLANK PAIN: 0
DIFFICULTY URINATING: 1
CONSTITUTIONAL NEGATIVE: 1
DYSURIA: 0
PSYCHIATRIC NEGATIVE: 1
NEUROLOGICAL NEGATIVE: 1
RESPIRATORY NEGATIVE: 1

## 2024-03-05 ASSESSMENT — PATIENT HEALTH QUESTIONNAIRE - PHQ9
2. FEELING DOWN, DEPRESSED OR HOPELESS: NOT AT ALL
SUM OF ALL RESPONSES TO PHQ9 QUESTIONS 1 AND 2: 0
1. LITTLE INTEREST OR PLEASURE IN DOING THINGS: NOT AT ALL

## 2024-03-05 ASSESSMENT — ACTIVITIES OF DAILY LIVING (ADL)
DRESSING: INDEPENDENT
DOING_HOUSEWORK: INDEPENDENT
GROCERY_SHOPPING: INDEPENDENT
TAKING_MEDICATION: INDEPENDENT
MANAGING_FINANCES: INDEPENDENT
BATHING: INDEPENDENT

## 2024-03-05 NOTE — PROGRESS NOTES
"Subjective   Patient ID: Ciaran Maciel Jr \"Randolph\" is a 79 y.o. male who presents for Medicare Annual Wellness Visit Subsequent (Medicare annual wellness /Itching in the right ear and runny nose/Trouble finishing urinating - very slow stream/Would like kenalog shot).  Hard time urinating. No issue starting stream, starts and stops. Last PSA 5/2022. No pain when urinating, no blood.     [facility]. first 2 days helped, he will continue taking this and seen.    Right ear itchy.  Has had problems with earwax buildup before.  Has tried home flushes.  Has not tried Debrox.    Nose running.  States gets Kenalog shots twice yearly which helps.  Last 1 was approximately 6 months ago.    Breathes shallow and through his mouth.  Wife and him sleep separate so he is unsure if he snores.    Had one fall over summer because he tripped.  No other falls.  Recovered well.    [facility], reviewed, all unremarkable.        Review of Systems   Constitutional: Negative.    HENT:  Positive for congestion, ear discharge and rhinorrhea.    Respiratory: Negative.     Cardiovascular: Negative.    Gastrointestinal: Negative.    Genitourinary:  Positive for difficulty urinating. Negative for decreased urine volume, dysuria, flank pain, hematuria, penile discharge, penile pain and urgency.   Neurological: Negative.    Psychiatric/Behavioral: Negative.     All other systems reviewed and are negative.      Objective   Physical Exam  Vitals reviewed.   Constitutional:       Appearance: Normal appearance.   Cardiovascular:      Rate and Rhythm: Normal rate and regular rhythm.   Musculoskeletal:         General: Normal range of motion.   Skin:     General: Skin is warm and dry.   Neurological:      General: No focal deficit present.      Mental Status: He is alert. Mental status is at baseline.   Psychiatric:         Mood and Affect: Mood normal.         Behavior: Behavior normal.         Body mass index is 30.79 " kg/m².      Current Outpatient Medications:     cholecalciferol (Vitamin D3) 1,250 mcg (50,000 unit) tablet, Take by mouth every 30 (thirty) days., Disp: , Rfl:     clopidogrel (Plavix) 75 mg tablet, Take 1 tablet (75 mg) by mouth once daily., Disp: , Rfl:     colestipol (Colestid) 1 gram tablet, Take by mouth twice a day., Disp: , Rfl:     donepezil (Aricept) 5 mg tablet, TAKE ONE TABLET BY MOUTH EVERY DAY AT BEDTIME, Disp: 30 tablet, Rfl: 0    doxazosin (Cardura) 4 mg tablet, Take 2 tablets (8 mg) by mouth once daily., Disp: , Rfl:     ezetimibe (Zetia) 10 mg tablet, Take 1 tablet (10 mg) by mouth once daily., Disp: , Rfl:     fludrocortisone (Florinef) 0.1 mg tablet, Take 1 tablet (0.1 mg) by mouth once daily in the morning. Take before meals., Disp: , Rfl:     metoprolol tartrate (Lopressor) 25 mg tablet, Take 1 tablet (25 mg) by mouth once daily., Disp: , Rfl:     multivitamin tablet, Take 1 tablet by mouth once daily., Disp: , Rfl:     naproxen (Naprosyn) 500 mg tablet, , Disp: , Rfl:     rivaroxaban (Xarelto) 20 mg tablet, Take 1 tablet (20 mg) by mouth once daily., Disp: , Rfl:     TRAZODONE HCL ER PO, Take 25 mg by mouth once daily., Disp: , Rfl:     venlafaxine (Effexor) 37.5 mg tablet, Take 2 tablets (75 mg) by mouth once daily., Disp: , Rfl:     acetaminophen (Tylenol) 500 mg tablet, Take 2 tablets (1,000 mg) by mouth every 8 hours if needed (pain)., Disp: , Rfl:     aspirin 325 mg tablet, Take 1 tablet (325 mg) by mouth once daily., Disp: , Rfl:     cephalexin (Keftab) 500 mg tablet, Take by mouth. For Dental procedures only, Disp: , Rfl:     cetirizine (ZyrTEC) 10 mg tablet, Take 1 tablet (10 mg) by mouth once daily., Disp: , Rfl:     cholecalciferol (Vitamin D-3) 25 MCG (1000 UT) tablet, Take 1 tablet (25 mcg) by mouth once daily., Disp: , Rfl:     omega 3-dha-epa-fish oil (Fish OiL) 1,200 (144-216) mg capsule, Take 3 capsules (3,600 mg) by mouth once daily., Disp: , Rfl:     tamsulosin (Flomax) 0.4  mg 24 hr capsule, Take 1 capsule (0.4 mg) by mouth once daily., Disp: 90 capsule, Rfl: 1  No current facility-administered medications for this visit.      Assessment/Plan   Diagnoses and all orders for this visit:  Medicare annual wellness visit, subsequent  Comments:  UTD on vaccines  Prostate cancer screening  -     Prostate Spec.Ag,Screen  Benign prostatic hyperplasia without lower urinary tract symptoms  Comments:  start flomax  check PSA  Orders:  -     tamsulosin (Flomax) 0.4 mg 24 hr capsule; Take 1 capsule (0.4 mg) by mouth once daily.  Chronic rhinitis  -     triamcinolone acetonide (Kenalog-40) injection 40 mg  Hearing loss of right ear due to cerumen impaction  Comments:  recommended debrox at home  can flush in office if needed in future  Difficulty in urination    Follow up in 6 months       Gerri Sharma DO 03/05/24 4:45 PM

## 2024-04-03 DIAGNOSIS — R41.3 MEMORY CHANGES: ICD-10-CM

## 2024-04-03 RX ORDER — DONEPEZIL HYDROCHLORIDE 5 MG/1
5 TABLET, FILM COATED ORAL NIGHTLY
Qty: 30 TABLET | Refills: 0 | Status: SHIPPED | OUTPATIENT
Start: 2024-04-03 | End: 2024-05-02

## 2024-04-10 NOTE — PROGRESS NOTES
"ENT Follow up Visit    History Of Present Illness  Ciaran Maciel Jr \"Randolph\" is a 79 y.o. male presents for follow up      s/p wle left face and SLNB. negative margins and negative nodes. Stage 1b     doing well, has not noticed any new skin lesions or masses in the neck. He has seen dermatology and will be seen every 6 months          Past Medical History  He has a past medical history of Acute embolism and thrombosis of unspecified deep veins of unspecified lower extremity (Multi) (08/23/2013), Body mass index (BMI) 29.0-29.9, adult, Personal history of colonic polyps (05/19/2014), Personal history of other diseases of the digestive system, Personal history of other endocrine, nutritional and metabolic disease, Personal history of other infectious and parasitic diseases, and Rhabdomyolysis (12/13/2017).    Surgical History  He has a past surgical history that includes Elbow surgery (05/19/2014); Other surgical history (05/19/2014); Other surgical history (05/28/2021); Other surgical history (05/28/2021); Other surgical history (05/28/2021); Other surgical history (05/28/2021); Other surgical history (05/28/2021); Tonsillectomy (05/28/2021); Hemorrhoid surgery (05/28/2021); Hernia repair (05/28/2021); Knee arthroscopy w/ debridement (05/28/2021); Other surgical history (11/09/2015); Other surgical history (01/07/2019); MR angio head wo IV contrast (12/28/2022); and MR angio neck wo IV contrast (12/28/2022).     Social History  He reports that he has never smoked. He has never used smokeless tobacco. No history on file for alcohol use and drug use.    Family History  No family history on file.     Allergies  Aspirin-dipyridamole and Statins-hmg-coa reductase inhibitors     Physical Exam:  CONSTITUTIONAL:  No acute distress  VOICE:  No hoarseness or other abnormality  RESPIRATION:  Breathing comfortably, no stridor  CV:  No clubbing/cyanosis/edema in hands  EYES:  EOM intact, sclera normal  NEURO:  Alert and " oriented times 3, Cranial nerves II-XII grossly intact and symmetric bilaterally  HEAD AND FACE:  Symmetric facial features, no masses or lesions, sinuses non-tender to palpation  SALIVARY GLANDS:  Parotid and submandibular glands normal bilaterally  EARS:  Normal external ears, external auditory canals, and TMs to otoscopy, normal hearing to whispered voice.  NOSE:  External nose midline, anterior rhinoscopy is normal with limited visualization to the anterior aspect of the interior turbinates, no bleeding or drainage, no lesions  ORAL CAVITY/OROPHARYNX/LIPS:  Normal mucous membranes, normal floor of mouth/tongue/OP, no masses or lesions  PHARYNGEAL WALLS:  No masses or lesions  NECK/LYMPH:  No LAD, no thyroid masses, trachea midline  SKIN:  prior incisions well healed  PSYCH:  Alert and oriented with appropriate mood and affect         Last Recorded Vitals  Height 1.829 m (6'), weight 104 kg (229 lb 9.6 oz).    Assessment and Plan  79 y.o. malereferred for melanoma with 1.6 mm depth s/p WLE and SLNB (6/2021). Margins and nodes negative. Stage 1b. Close observation recommended at tumor board     -f/u in 9 months  -continue to see dermatology as scheduled  -Follow-up earlier with any new skin lesions or masses in the head neck    Juan Antonio Covarrubias MD

## 2024-04-12 ENCOUNTER — OFFICE VISIT (OUTPATIENT)
Dept: OTOLARYNGOLOGY | Facility: CLINIC | Age: 79
End: 2024-04-12
Payer: MEDICARE

## 2024-04-12 VITALS — WEIGHT: 229.6 LBS | BODY MASS INDEX: 31.1 KG/M2 | HEIGHT: 72 IN

## 2024-04-12 DIAGNOSIS — C43.39 MALIGNANT MELANOMA OF OTHER PARTS OF FACE (MULTI): Primary | ICD-10-CM

## 2024-04-12 PROCEDURE — 1160F RVW MEDS BY RX/DR IN RCRD: CPT | Performed by: OTOLARYNGOLOGY

## 2024-04-12 PROCEDURE — 1159F MED LIST DOCD IN RCRD: CPT | Performed by: OTOLARYNGOLOGY

## 2024-04-12 PROCEDURE — 1036F TOBACCO NON-USER: CPT | Performed by: OTOLARYNGOLOGY

## 2024-04-12 PROCEDURE — 1157F ADVNC CARE PLAN IN RCRD: CPT | Performed by: OTOLARYNGOLOGY

## 2024-04-12 PROCEDURE — 99213 OFFICE O/P EST LOW 20 MIN: CPT | Performed by: OTOLARYNGOLOGY

## 2024-04-12 PROCEDURE — 1125F AMNT PAIN NOTED PAIN PRSNT: CPT | Performed by: OTOLARYNGOLOGY

## 2024-04-12 ASSESSMENT — PAIN SCALES - GENERAL: PAINLEVEL: 6

## 2024-05-02 DIAGNOSIS — R41.3 MEMORY CHANGES: ICD-10-CM

## 2024-05-02 RX ORDER — DONEPEZIL HYDROCHLORIDE 5 MG/1
5 TABLET, FILM COATED ORAL NIGHTLY
Qty: 30 TABLET | Refills: 0 | Status: SHIPPED | OUTPATIENT
Start: 2024-05-02 | End: 2024-06-03

## 2024-06-03 DIAGNOSIS — R41.3 MEMORY CHANGES: ICD-10-CM

## 2024-06-03 RX ORDER — DONEPEZIL HYDROCHLORIDE 5 MG/1
5 TABLET, FILM COATED ORAL NIGHTLY
Qty: 30 TABLET | Refills: 0 | Status: SHIPPED | OUTPATIENT
Start: 2024-06-03

## 2024-07-03 DIAGNOSIS — R41.3 MEMORY CHANGES: ICD-10-CM

## 2024-07-03 RX ORDER — DONEPEZIL HYDROCHLORIDE 5 MG/1
5 TABLET, FILM COATED ORAL NIGHTLY
Qty: 90 TABLET | Refills: 2 | Status: SHIPPED | OUTPATIENT
Start: 2024-07-03

## 2024-07-23 ENCOUNTER — HOSPITAL ENCOUNTER (OUTPATIENT)
Dept: RADIOLOGY | Facility: HOSPITAL | Age: 79
Discharge: HOME | End: 2024-07-23
Payer: MEDICARE

## 2024-07-23 ENCOUNTER — OFFICE VISIT (OUTPATIENT)
Dept: ORTHOPEDIC SURGERY | Facility: HOSPITAL | Age: 79
End: 2024-07-23
Payer: MEDICARE

## 2024-07-23 VITALS — HEIGHT: 72 IN | WEIGHT: 229 LBS | BODY MASS INDEX: 31.02 KG/M2

## 2024-07-23 DIAGNOSIS — G56.02 CARPAL TUNNEL SYNDROME ON LEFT: Primary | ICD-10-CM

## 2024-07-23 DIAGNOSIS — M25.532 LEFT WRIST PAIN: Primary | ICD-10-CM

## 2024-07-23 DIAGNOSIS — M25.532 LEFT WRIST PAIN: ICD-10-CM

## 2024-07-23 DIAGNOSIS — M19.049 CMC ARTHRITIS: ICD-10-CM

## 2024-07-23 PROCEDURE — 73110 X-RAY EXAM OF WRIST: CPT | Mod: LEFT SIDE | Performed by: RADIOLOGY

## 2024-07-23 PROCEDURE — 99213 OFFICE O/P EST LOW 20 MIN: CPT | Performed by: ORTHOPAEDIC SURGERY

## 2024-07-23 PROCEDURE — 1159F MED LIST DOCD IN RCRD: CPT | Performed by: ORTHOPAEDIC SURGERY

## 2024-07-23 PROCEDURE — 1157F ADVNC CARE PLAN IN RCRD: CPT | Performed by: ORTHOPAEDIC SURGERY

## 2024-07-23 PROCEDURE — 1036F TOBACCO NON-USER: CPT | Performed by: ORTHOPAEDIC SURGERY

## 2024-07-23 PROCEDURE — 73110 X-RAY EXAM OF WRIST: CPT | Mod: LT

## 2024-07-23 ASSESSMENT — PAIN - FUNCTIONAL ASSESSMENT: PAIN_FUNCTIONAL_ASSESSMENT: 0-10

## 2024-07-23 ASSESSMENT — PAIN DESCRIPTION - DESCRIPTORS: DESCRIPTORS: ACHING;SORE

## 2024-07-23 ASSESSMENT — PAIN SCALES - GENERAL: PAINLEVEL_OUTOF10: 5 - MODERATE PAIN

## 2024-07-23 NOTE — LETTER
July 23, 2024     Gerri Sharma DO  [address] [facility] [id number]    Patient: Randolph Maciel Jr   YOB: 1945   Date of Visit: 7/23/2024       Dear Dr. Gerri Sharma DO:    Thank you for referring Randolph Maciel Jr to me for evaluation. Below are my notes for this consultation.  If you have questions, please do not hesitate to call me. I look forward to following your patient along with you.       Sincerely,     Estrada Moya MD      CC: No Recipients  ______________________________________________________________________________________    Randolph returns today for his left hand.  In January 2022 I gave him a left ring finger trigger finger release and a left carpal tunnel steroid injection.  He has done very well for a long period of time but about 3 weeks ago his hand became acutely swollen and his carpal tunnel symptoms returned.  This was without any identifiable preceding trauma.  He went to a local urgent care and was discharged home on oral steroid pack and his symptoms resolved fairly quickly but now he is ready to simply just get his carpal tunnel syndrome taken care of.  He is experiencing some pain at the base of the left thumb and he does have a brace to wear for his thumb arthritis.    Past medical history, medications, allergies, surgical history and review of systems have been reviewed with the patient. Pertinent changes are documented in the HPI. Otherwise they are unchanged when compared to last visit on February 15, 2022.    Physical Examination Findings:  Constitutional: Appears well-developed and well-nourished.  Head: Normocephalic and atraumatic.  Eyes: Pupils are equal and round.  Cardiovascular: Intact distal pulses.   Respiratory: Effort normal. No respiratory distress.  Neurologic: Alert and oriented to person, place, and time.  Skin: Skin is warm and dry.  Hematologic / Lymphatic: No lymphedema, lymphangitis.  Psychiatric: normal mood and affect. Behavior is normal.    Musculoskeletal: Left hand examination with mild degenerative changes.  Bony prominence at the base of the thumb.  Positive CMC grind test without instability.  No MP joint instability.  No thenar atrophy.  Healed surgical incision from prior ring finger trigger finger release.  Positive Tinel sign over the transverse carpal ligament.  Positive Laina median nerve compression test.  Diminished sensation median nerve distribution.    X-rays left wrist obtained today demonstrates mild diffuse degenerative changes but with more advanced arthritis at the thumb CMC joint.    Impression: Left carpal tunnel syndrome, left thumb CMC joint arthritis.    Plan: Diagnosis of carpal tunnel syndrome is clear based on symptoms and physical examination findings.  He has also had excellent relief from prior carpal tunnel steroid injections.  He is now ready to proceed with definitive treatment for his carpal tunnel syndrome.  We have discussed management options for his thumb arthritis.  He is requesting injection to be performed at the same time as his carpal tunnel release.  He wants to do this under local anesthesia.  This means he does not have to hold any of his anticoagulation or other medications.    We discussed risks, benefits, alternatives and anticipated post op course including time away from work and activities following surgical treatment for the patient's condition. This is major surgery with identifiable risks. No guarantees for success have been provided. The patient has expressed understanding and has elected to pursue operative treatment.        For Surgical Planning:  Diagnosis: Left carpal tunnel syndrome, left thumb CMC arthritis  Procedure: Left carpal tunnel release, left thumb CMC joint corticosteroid injection  CPT: 96885, 20600  Anesthesia: Local only  Duration: 25 minutes  Special Equipment Needed: None  Medical Notes / PM / DM / PAT needed?:  N/A  Location: [facility]  Initial Post Operative  Visit: 2 weeks Estrada Moya MD    [facility] [employer]  Department of Orthopaedic Surgery  Chief of Hand and Upper Extremity Surgery  [facility]    Dictation performed with the use of voice recognition software. Syntax and grammatical errors may exist.        []

## 2024-07-23 NOTE — PROGRESS NOTES
Randolph returns today for his left hand.  In January 2022 I gave him a left ring finger trigger finger release and a left carpal tunnel steroid injection.  He has done very well for a long period of time but about 3 weeks ago his hand became acutely swollen and his carpal tunnel symptoms returned.  This was without any identifiable preceding trauma.  He went to a local urgent care and was discharged home on oral steroid pack and his symptoms resolved fairly quickly but now he is ready to simply just get his carpal tunnel syndrome taken care of.  He is experiencing some pain at the base of the left thumb and he does have a brace to wear for his thumb arthritis.    Past medical history, medications, allergies, surgical history and review of systems have been reviewed with the patient. Pertinent changes are documented in the HPI. Otherwise they are unchanged when compared to last visit on February 15, 2022.    Physical Examination Findings:  Constitutional: Appears well-developed and well-nourished.  Head: Normocephalic and atraumatic.  Eyes: Pupils are equal and round.  Cardiovascular: Intact distal pulses.   Respiratory: Effort normal. No respiratory distress.  Neurologic: Alert and oriented to person, place, and time.  Skin: Skin is warm and dry.  Hematologic / Lymphatic: No lymphedema, lymphangitis.  Psychiatric: normal mood and affect. Behavior is normal.   Musculoskeletal: Left hand examination with mild degenerative changes.  Bony prominence at the base of the thumb.  Positive CMC grind test without instability.  No MP joint instability.  No thenar atrophy.  Healed surgical incision from prior ring finger trigger finger release.  Positive Tinel sign over the transverse carpal ligament.  Positive Laina median nerve compression test.  Diminished sensation median nerve distribution.    X-rays left wrist obtained today demonstrates mild diffuse degenerative changes but with more advanced arthritis at the thumb CMC  joint.    Impression: Left carpal tunnel syndrome, left thumb CMC joint arthritis.    Plan: Diagnosis of carpal tunnel syndrome is clear based on symptoms and physical examination findings.  He has also had excellent relief from prior carpal tunnel steroid injections.  He is now ready to proceed with definitive treatment for his carpal tunnel syndrome.  We have discussed management options for his thumb arthritis.  He is requesting injection to be performed at the same time as his carpal tunnel release.  He wants to do this under local anesthesia.  This means he does not have to hold any of his anticoagulation or other medications.    We discussed risks, benefits, alternatives and anticipated post op course including time away from work and activities following surgical treatment for the patient's condition. This is major surgery with identifiable risks. No guarantees for success have been provided. The patient has expressed understanding and has elected to pursue operative treatment.        For Surgical Planning:  Diagnosis: Left carpal tunnel syndrome, left thumb CMC arthritis  Procedure: Left carpal tunnel release, left thumb CMC joint corticosteroid injection  CPT: 76482, 76091  Anesthesia: Local only  Duration: 25 minutes  Special Equipment Needed: None  Medical Notes / PM / DM / PAT needed?:  N/A  Location: [place] or [facility]  Initial Post Operative Visit: 2 weeks Estrada Moya MD    [facility] [employer]  Department of Orthopaedic Surgery  Chief of Hand and Upper Extremity Surgery  [facility]    Dictation performed with the use of voice recognition software. Syntax and grammatical errors may exist.        []

## 2024-08-14 ENCOUNTER — TELEPHONE (OUTPATIENT)
Dept: PRIMARY CARE | Facility: CLINIC | Age: 79
End: 2024-08-14
Payer: MEDICARE

## 2024-08-14 DIAGNOSIS — E78.2 MIXED HYPERLIPIDEMIA: Primary | ICD-10-CM

## 2024-08-14 DIAGNOSIS — Z13.29 THYROID DISORDER SCREENING: ICD-10-CM

## 2024-08-15 ENCOUNTER — TELEPHONE (OUTPATIENT)
Dept: PRIMARY CARE | Facility: CLINIC | Age: 79
End: 2024-08-15
Payer: MEDICARE

## 2024-08-15 NOTE — TELEPHONE ENCOUNTER
Lab orders have been placed. Please fast 10-12 hours prior to having them drawn. You can drink water or black coffee.     LVM

## 2024-08-27 ENCOUNTER — LAB (OUTPATIENT)
Dept: LAB | Facility: LAB | Age: 79
End: 2024-08-27
Payer: MEDICARE

## 2024-08-27 DIAGNOSIS — Z13.29 THYROID DISORDER SCREENING: ICD-10-CM

## 2024-08-27 DIAGNOSIS — E78.2 MIXED HYPERLIPIDEMIA: ICD-10-CM

## 2024-08-27 LAB
ALBUMIN SERPL-MCNC: 3.8 G/DL (ref 3.5–5)
ALP BLD-CCNC: 82 U/L (ref 35–125)
ALT SERPL-CCNC: 22 U/L (ref 5–40)
ANION GAP SERPL CALC-SCNC: 12 MMOL/L
AST SERPL-CCNC: 29 U/L (ref 5–40)
BASOPHILS # BLD AUTO: 0.06 X10*3/UL (ref 0–0.1)
BASOPHILS NFR BLD AUTO: 0.9 %
BILIRUB SERPL-MCNC: 0.8 MG/DL (ref 0.1–1.2)
BUN SERPL-MCNC: 15 MG/DL (ref 8–25)
CALCIUM SERPL-MCNC: 8.6 MG/DL (ref 8.5–10.4)
CHLORIDE SERPL-SCNC: 104 MMOL/L (ref 97–107)
CHOLEST SERPL-MCNC: 178 MG/DL (ref 133–200)
CHOLEST/HDLC SERPL: 3.5 {RATIO}
CO2 SERPL-SCNC: 26 MMOL/L (ref 24–31)
CREAT SERPL-MCNC: 0.9 MG/DL (ref 0.4–1.6)
EGFRCR SERPLBLD CKD-EPI 2021: 88 ML/MIN/1.73M*2
EOSINOPHIL # BLD AUTO: 0.28 X10*3/UL (ref 0–0.4)
EOSINOPHIL NFR BLD AUTO: 4.1 %
ERYTHROCYTE [DISTWIDTH] IN BLOOD BY AUTOMATED COUNT: 13.8 % (ref 11.5–14.5)
GLUCOSE SERPL-MCNC: 93 MG/DL (ref 65–99)
HCT VFR BLD AUTO: 40.6 % (ref 41–52)
HDLC SERPL-MCNC: 51 MG/DL
HGB BLD-MCNC: 13.8 G/DL (ref 13.5–17.5)
IMM GRANULOCYTES # BLD AUTO: 0.03 X10*3/UL (ref 0–0.5)
IMM GRANULOCYTES NFR BLD AUTO: 0.4 % (ref 0–0.9)
LDLC SERPL CALC-MCNC: 110 MG/DL (ref 65–130)
LYMPHOCYTES # BLD AUTO: 1.72 X10*3/UL (ref 0.8–3)
LYMPHOCYTES NFR BLD AUTO: 25.4 %
MCH RBC QN AUTO: 32.2 PG (ref 26–34)
MCHC RBC AUTO-ENTMCNC: 34 G/DL (ref 32–36)
MCV RBC AUTO: 95 FL (ref 80–100)
MONOCYTES # BLD AUTO: 0.46 X10*3/UL (ref 0.05–0.8)
MONOCYTES NFR BLD AUTO: 6.8 %
NEUTROPHILS # BLD AUTO: 4.21 X10*3/UL (ref 1.6–5.5)
NEUTROPHILS NFR BLD AUTO: 62.4 %
NRBC BLD-RTO: 0 /100 WBCS (ref 0–0)
PLATELET # BLD AUTO: 167 X10*3/UL (ref 150–450)
POTASSIUM SERPL-SCNC: 3.8 MMOL/L (ref 3.4–5.1)
PROT SERPL-MCNC: 6.1 G/DL (ref 5.9–7.9)
RBC # BLD AUTO: 4.28 X10*6/UL (ref 4.5–5.9)
SODIUM SERPL-SCNC: 142 MMOL/L (ref 133–145)
TRIGL SERPL-MCNC: 83 MG/DL (ref 40–150)
TSH SERPL DL<=0.05 MIU/L-ACNC: 1.36 MIU/L (ref 0.27–4.2)
WBC # BLD AUTO: 6.8 X10*3/UL (ref 4.4–11.3)

## 2024-08-27 PROCEDURE — 36415 COLL VENOUS BLD VENIPUNCTURE: CPT

## 2024-09-05 ENCOUNTER — OFFICE VISIT (OUTPATIENT)
Dept: OPHTHALMOLOGY | Facility: CLINIC | Age: 79
End: 2024-09-05
Payer: MEDICARE

## 2024-09-05 ENCOUNTER — APPOINTMENT (OUTPATIENT)
Dept: OPHTHALMOLOGY | Facility: CLINIC | Age: 79
End: 2024-09-05
Payer: MEDICARE

## 2024-09-05 DIAGNOSIS — H25.13 AGE-RELATED NUCLEAR CATARACT, BILATERAL: Primary | ICD-10-CM

## 2024-09-05 DIAGNOSIS — H52.7 DISORDER OF REFRACTION: ICD-10-CM

## 2024-09-05 DIAGNOSIS — H40.013 OPEN ANGLE WITH BORDERLINE FINDINGS, LOW RISK, BILATERAL: ICD-10-CM

## 2024-09-05 PROCEDURE — 99214 OFFICE O/P EST MOD 30 MIN: CPT | Performed by: OPHTHALMOLOGY

## 2024-09-05 ASSESSMENT — EXTERNAL EXAM - RIGHT EYE: OD_EXAM: NORMAL

## 2024-09-05 ASSESSMENT — REFRACTION_WEARINGRX
OS_SPHERE: +2.75
OD_SPHERE: +2.25
OD_AXIS: 175
OD_ADD: +3.00
OS_AXIS: 178
OS_CYLINDER: -0.25
OD_CYLINDER: -0.50
OS_ADD: +3.00

## 2024-09-05 ASSESSMENT — VISUAL ACUITY
OS_CC: 20/40
OD_CC: 20/40
OS_CC+: -2
CORRECTION_TYPE: GLASSES
METHOD: SNELLEN - LINEAR
OD_CC+: -1

## 2024-09-05 ASSESSMENT — KERATOMETRY
OD_K1POWER_DIOPTERS: 41.75
OS_AXISANGLE2_DEGREES: 85
OD_AXISANGLE_DEGREES: 180
OS_K1POWER_DIOPTERS: 41.75
OD_AXISANGLE2_DEGREES: 90
OS_K2POWER_DIOPTERS: 42.50
OS_AXISANGLE_DEGREES: 175
OD_K2POWER_DIOPTERS: 42.25

## 2024-09-05 ASSESSMENT — ENCOUNTER SYMPTOMS
PSYCHIATRIC NEGATIVE: 0
EYES NEGATIVE: 0
HEMATOLOGIC/LYMPHATIC NEGATIVE: 0
ALLERGIC/IMMUNOLOGIC NEGATIVE: 0
NEUROLOGICAL NEGATIVE: 0
CONSTITUTIONAL NEGATIVE: 0
CARDIOVASCULAR NEGATIVE: 0
MUSCULOSKELETAL NEGATIVE: 0
ENDOCRINE NEGATIVE: 0
GASTROINTESTINAL NEGATIVE: 0
RESPIRATORY NEGATIVE: 0

## 2024-09-05 ASSESSMENT — SLIT LAMP EXAM - LIDS
COMMENTS: NORMAL
COMMENTS: NORMAL

## 2024-09-05 ASSESSMENT — PAIN SCALES - GENERAL: PAINLEVEL: 0-NO PAIN

## 2024-09-05 ASSESSMENT — CUP TO DISC RATIO
OS_RATIO: 0.6
OD_RATIO: 0.5

## 2024-09-05 ASSESSMENT — TONOMETRY
OS_IOP_MMHG: 13
IOP_METHOD: GOLDMANN APPLANATION
OD_IOP_MMHG: 13

## 2024-09-05 ASSESSMENT — EXTERNAL EXAM - LEFT EYE: OS_EXAM: NORMAL

## 2024-09-05 NOTE — PROGRESS NOTES
Assessment/Plan   Problem List Items Addressed This Visit       Age-related nuclear cataract, bilateral - Primary     Non significant cataract noted on exam. Will plan to continue to monitor with serial exam.            Disorder of refraction     Discussed glasses prescription from refraction. Will provide if patient interested in keeping for records or to fill as a new set of glasses.            Open angle with borderline findings, low risk, bilateral     Still consider low risk with prior workup, normal pressures, and no progression on exam since last year. Will continue to monitor with serial exam.             Provided reassurance regarding above diagnoses and care received in the office visit today. Discussed outcomes and options along with the importance of treatment compliance. Understands the importance of any follow up visits. Patient instructed to call/communicate with our office if any new issues, questions, or concerns.     Will plan to see back in 1 year full or sooner PRN

## 2024-09-05 NOTE — ASSESSMENT & PLAN NOTE
Still consider low risk with prior workup, normal pressures, and no progression on exam since last year. Will continue to monitor with serial exam.

## 2024-09-05 NOTE — PATIENT INSTRUCTIONS
Thank you so much for choosing me to provide your care today!    If you were dilated your vision may remain blurry   or light sensitive for several hours.    The nature of eye and vision problems can require frequent follow up, please make every effort to adhere to any future appointments.    If you have any issues, questions, or concerns,   please do not hesitate to reach out.    If you receive a survey in regards to your care today, please mention any exceptional care my office staff and/or technicians provided.    You can reach our office at this number:    [phone]    Please consider signing up for and utilizing [employer]!  This is the best way to directly reach me or other  providers

## 2024-09-06 ENCOUNTER — APPOINTMENT (OUTPATIENT)
Dept: PRIMARY CARE | Facility: CLINIC | Age: 79
End: 2024-09-06
Payer: MEDICARE

## 2024-09-06 VITALS
SYSTOLIC BLOOD PRESSURE: 110 MMHG | BODY MASS INDEX: 31.02 KG/M2 | WEIGHT: 229 LBS | DIASTOLIC BLOOD PRESSURE: 60 MMHG | HEIGHT: 72 IN | OXYGEN SATURATION: 92 % | HEART RATE: 58 BPM

## 2024-09-06 DIAGNOSIS — R06.09 DYSPNEA ON EXERTION: Chronic | ICD-10-CM

## 2024-09-06 DIAGNOSIS — J30.1 SEASONAL ALLERGIC RHINITIS DUE TO POLLEN: ICD-10-CM

## 2024-09-06 DIAGNOSIS — N40.0 BENIGN PROSTATIC HYPERPLASIA WITHOUT LOWER URINARY TRACT SYMPTOMS: Chronic | ICD-10-CM

## 2024-09-06 DIAGNOSIS — I48.91 ATRIAL FIBRILLATION, UNSPECIFIED TYPE (MULTI): Primary | Chronic | ICD-10-CM

## 2024-09-06 DIAGNOSIS — R53.83 OTHER FATIGUE: ICD-10-CM

## 2024-09-06 PROBLEM — E85.9 AMYLOIDOSIS, UNSPECIFIED TYPE (MULTI): Status: ACTIVE | Noted: 2024-09-06

## 2024-09-06 PROBLEM — S06.6X9A SUBARACHNOID HEMORRHAGE FOLLOWING INJURY, WITH LOSS OF CONSCIOUSNESS (MULTI): Status: RESOLVED | Noted: 2023-09-22 | Resolved: 2024-09-06

## 2024-09-06 PROBLEM — F01.A0 MILD VASCULAR DEMENTIA WITHOUT BEHAVIORAL DISTURBANCE, PSYCHOTIC DISTURBANCE, MOOD DISTURBANCE, OR ANXIETY (MULTI): Status: RESOLVED | Noted: 2023-09-05 | Resolved: 2024-09-06

## 2024-09-06 PROBLEM — E85.9 AMYLOIDOSIS, UNSPECIFIED TYPE (MULTI): Status: RESOLVED | Noted: 2024-09-06 | Resolved: 2024-09-06

## 2024-09-06 RX ORDER — MIDODRINE HYDROCHLORIDE 5 MG/1
5 TABLET ORAL 2 TIMES DAILY
COMMUNITY

## 2024-09-06 RX ORDER — TRIAMCINOLONE ACETONIDE 40 MG/ML
40 INJECTION, SUSPENSION INTRA-ARTICULAR; INTRAMUSCULAR ONCE
Status: COMPLETED | OUTPATIENT
Start: 2024-09-06 | End: 2024-09-06

## 2024-09-06 ASSESSMENT — ENCOUNTER SYMPTOMS
MUSCULOSKELETAL NEGATIVE: 1
FATIGUE: 1
PSYCHIATRIC NEGATIVE: 1
APPETITE CHANGE: 0
CHEST TIGHTNESS: 0
ACTIVITY CHANGE: 0
COUGH: 0
SHORTNESS OF BREATH: 1
GASTROINTESTINAL NEGATIVE: 1
NEUROLOGICAL NEGATIVE: 1
RHINORRHEA: 1
CARDIOVASCULAR NEGATIVE: 1

## 2024-09-06 ASSESSMENT — PATIENT HEALTH QUESTIONNAIRE - PHQ9
1. LITTLE INTEREST OR PLEASURE IN DOING THINGS: NOT AT ALL
2. FEELING DOWN, DEPRESSED OR HOPELESS: NOT AT ALL
SUM OF ALL RESPONSES TO PHQ9 QUESTIONS 1 AND 2: 0

## 2024-09-06 NOTE — PROGRESS NOTES
"Subjective   Patient ID: Ciaran Maciel Jr \"Randolph\" is a 79 y.o. male who presents for Follow-up (Follow up /Having a lot of fatigue- had bloodwork done so it could be looked over today/Would like kenalog allergy shot today).  Complains of fatigue. Generally sleeps well at night. States can sleep easily during the day.     Was started on midodrine about 3 months ago. Did not notice much change with energy, BP is doing better.     Had cardiac MRI one year ago. Amyloidosis ruled out.     Chronic dyspnea on exertion. Last echo 2022. Mild ankle swelling.     Uses eclipse machine to move his legs and gets 10k steps with this. Does not do other dedicated exercise.     Chronic a fib stable.     No other concerns today.         Review of Systems   Constitutional:  Positive for fatigue. Negative for activity change and appetite change.   HENT:  Positive for congestion and rhinorrhea.    Respiratory:  Positive for shortness of breath. Negative for cough and chest tightness.    Cardiovascular: Negative.    Gastrointestinal: Negative.    Musculoskeletal: Negative.    Neurological: Negative.    Psychiatric/Behavioral: Negative.     All other systems reviewed and are negative.      Objective   Physical Exam  Vitals reviewed.   Constitutional:       Appearance: Normal appearance.   HENT:      Head: Normocephalic.   Eyes:      Pupils: Pupils are equal, round, and reactive to light.   Cardiovascular:      Rate and Rhythm: Normal rate.   Musculoskeletal:         General: Normal range of motion.   Skin:     General: Skin is warm and dry.   Neurological:      General: No focal deficit present.      Mental Status: He is alert. Mental status is at baseline.   Psychiatric:         Mood and Affect: Mood normal.         Behavior: Behavior normal.         Body mass index is 31.06 kg/m².      Current Outpatient Medications:     acetaminophen (Tylenol) 500 mg tablet, Take 2 tablets (1,000 mg) by mouth every 8 hours if needed (pain)., Disp: " , Rfl:     aspirin 325 mg tablet, Take 1 tablet (325 mg) by mouth once daily., Disp: , Rfl:     cephalexin (Keftab) 500 mg tablet, Take by mouth. For Dental procedures only, Disp: , Rfl:     cetirizine (ZyrTEC) 10 mg tablet, Take 1 tablet (10 mg) by mouth once daily., Disp: , Rfl:     cholecalciferol (Vitamin D-3) 25 MCG (1000 UT) tablet, Take 1 tablet (25 mcg) by mouth once daily., Disp: , Rfl:     cholecalciferol (Vitamin D3) 1,250 mcg (50,000 unit) tablet, Take by mouth every 30 (thirty) days., Disp: , Rfl:     clopidogrel (Plavix) 75 mg tablet, Take 1 tablet (75 mg) by mouth once daily., Disp: , Rfl:     colestipol (Colestid) 1 gram tablet, Take by mouth twice a day., Disp: , Rfl:     donepezil (Aricept) 5 mg tablet, Take 1 tablet (5 mg) by mouth once daily at bedtime., Disp: 90 tablet, Rfl: 2    doxazosin (Cardura) 4 mg tablet, Take 2 tablets (8 mg) by mouth once daily., Disp: , Rfl:     ezetimibe (Zetia) 10 mg tablet, Take 1 tablet (10 mg) by mouth once daily., Disp: , Rfl:     fludrocortisone (Florinef) 0.1 mg tablet, Take 1 tablet (0.1 mg) by mouth once daily in the morning. Take before meals., Disp: , Rfl:     metoprolol tartrate (Lopressor) 25 mg tablet, Take 1 tablet (25 mg) by mouth once daily., Disp: , Rfl:     midodrine (Proamatine) 5 mg tablet, Take 1 tablet (5 mg) by mouth 2 times a day., Disp: , Rfl:     multivitamin tablet, Take 1 tablet by mouth once daily., Disp: , Rfl:     naproxen (Naprosyn) 500 mg tablet, , Disp: , Rfl:     omega 3-dha-epa-fish oil (Fish OiL) 1,200 (144-216) mg capsule, Take 3 capsules (3,600 mg) by mouth once daily., Disp: , Rfl:     rivaroxaban (Xarelto) 20 mg tablet, Take 1 tablet (20 mg) by mouth once daily., Disp: , Rfl:     tamsulosin (Flomax) 0.4 mg 24 hr capsule, Take 1 capsule (0.4 mg) by mouth once daily., Disp: 90 capsule, Rfl: 1    TRAZODONE HCL ER PO, Take 25 mg by mouth once daily., Disp: , Rfl:     venlafaxine (Effexor) 37.5 mg tablet, Take 2 tablets (75 mg) by  mouth once daily., Disp: , Rfl:   No current facility-administered medications for this visit.    Assessment/Plan   Diagnoses and all orders for this visit:  Atrial fibrillation, unspecified type (Multi)  Comments:  stable  Orders:  -     Transthoracic Echo (TTE) Limited; Future  Other fatigue  -     Transthoracic Echo (TTE) Limited; Future  Dyspnea on exertion  Comments:  cardiac MRI one year ago  ruled out amyloidosis  echo ordered  Orders:  -     Transthoracic Echo (TTE) Limited; Future  Seasonal allergic rhinitis due to pollen  Comments:  requesting kenolog shot  Orders:  -     triamcinolone acetonide (Kenalog-40) injection 40 mg  Benign prostatic hyperplasia without lower urinary tract symptoms  Comments:  doing better with flomax    Follow up in 6 months       Gerri Sharma DO 09/06/24 4:41 PM

## 2024-09-09 DIAGNOSIS — N40.0 BENIGN PROSTATIC HYPERPLASIA WITHOUT LOWER URINARY TRACT SYMPTOMS: ICD-10-CM

## 2024-09-09 RX ORDER — TAMSULOSIN HYDROCHLORIDE 0.4 MG/1
0.4 CAPSULE ORAL DAILY
Qty: 90 CAPSULE | Refills: 0 | Status: SHIPPED | OUTPATIENT
Start: 2024-09-09

## 2024-09-20 ENCOUNTER — HOSPITAL ENCOUNTER (OUTPATIENT)
Dept: CARDIOLOGY | Facility: HOSPITAL | Age: 79
Discharge: HOME | End: 2024-09-20
Payer: MEDICARE

## 2024-09-20 ENCOUNTER — LAB (OUTPATIENT)
Dept: LAB | Facility: LAB | Age: 79
End: 2024-09-20
Payer: MEDICARE

## 2024-09-20 DIAGNOSIS — I48.91 ATRIAL FIBRILLATION, UNSPECIFIED TYPE (MULTI): Chronic | ICD-10-CM

## 2024-09-20 DIAGNOSIS — R06.09 DYSPNEA ON EXERTION: Chronic | ICD-10-CM

## 2024-09-20 DIAGNOSIS — R53.83 OTHER FATIGUE: ICD-10-CM

## 2024-09-20 DIAGNOSIS — R94.31 ABNORMAL ELECTROCARDIOGRAM (ECG) (EKG): ICD-10-CM

## 2024-09-20 LAB
AORTIC VALVE MEAN GRADIENT: 3.5 MMHG
AORTIC VALVE PEAK VELOCITY: 1.31 M/S
AV PEAK GRADIENT: 6.9 MMHG
AVA (PEAK VEL): 2.4 CM2
AVA (VTI): 2.68 CM2
EJECTION FRACTION APICAL 4 CHAMBER: 57.9
EJECTION FRACTION: 60 %
LEFT VENTRICLE INTERNAL DIMENSION DIASTOLE: 4.68 CM (ref 3.5–6)
LEFT VENTRICULAR OUTFLOW TRACT DIAMETER: 2.26 CM
LV EJECTION FRACTION BIPLANE: 57 %

## 2024-09-20 PROCEDURE — 93308 TTE F-UP OR LMTD: CPT | Performed by: INTERNAL MEDICINE

## 2024-09-20 PROCEDURE — 93325 DOPPLER ECHO COLOR FLOW MAPG: CPT

## 2024-09-20 PROCEDURE — 93321 DOPPLER ECHO F-UP/LMTD STD: CPT | Performed by: INTERNAL MEDICINE

## 2024-09-20 PROCEDURE — 93325 DOPPLER ECHO COLOR FLOW MAPG: CPT | Performed by: INTERNAL MEDICINE

## 2024-10-02 DIAGNOSIS — G56.02 CARPAL TUNNEL SYNDROME ON LEFT: ICD-10-CM

## 2024-10-09 ENCOUNTER — HOSPITAL ENCOUNTER (OUTPATIENT)
Facility: HOSPITAL | Age: 79
Setting detail: OUTPATIENT SURGERY
Discharge: HOME | End: 2024-10-09
Attending: ORTHOPAEDIC SURGERY | Admitting: ORTHOPAEDIC SURGERY
Payer: MEDICARE

## 2024-10-09 VITALS
BODY MASS INDEX: 30.28 KG/M2 | TEMPERATURE: 97.7 F | HEIGHT: 72 IN | SYSTOLIC BLOOD PRESSURE: 157 MMHG | RESPIRATION RATE: 16 BRPM | WEIGHT: 223.55 LBS | OXYGEN SATURATION: 97 % | HEART RATE: 87 BPM | DIASTOLIC BLOOD PRESSURE: 76 MMHG

## 2024-10-09 DIAGNOSIS — M18.12 ARTHRITIS OF CARPOMETACARPAL (CMC) JOINT OF LEFT THUMB: ICD-10-CM

## 2024-10-09 DIAGNOSIS — G56.02 CARPAL TUNNEL SYNDROME ON LEFT: Primary | ICD-10-CM

## 2024-10-09 PROCEDURE — 3600000003 HC OR TIME - INITIAL BASE CHARGE - PROCEDURE LEVEL THREE: Performed by: ORTHOPAEDIC SURGERY

## 2024-10-09 PROCEDURE — 64721 CARPAL TUNNEL SURGERY: CPT | Performed by: ORTHOPAEDIC SURGERY

## 2024-10-09 PROCEDURE — 2500000004 HC RX 250 GENERAL PHARMACY W/ HCPCS (ALT 636 FOR OP/ED): Performed by: ORTHOPAEDIC SURGERY

## 2024-10-09 PROCEDURE — 7100000010 HC PHASE TWO TIME - EACH INCREMENTAL 1 MINUTE: Performed by: ORTHOPAEDIC SURGERY

## 2024-10-09 PROCEDURE — 3600000008 HC OR TIME - EACH INCREMENTAL 1 MINUTE - PROCEDURE LEVEL THREE: Performed by: ORTHOPAEDIC SURGERY

## 2024-10-09 PROCEDURE — 20600 DRAIN/INJ JOINT/BURSA W/O US: CPT | Performed by: ORTHOPAEDIC SURGERY

## 2024-10-09 PROCEDURE — 7100000009 HC PHASE TWO TIME - INITIAL BASE CHARGE: Performed by: ORTHOPAEDIC SURGERY

## 2024-10-09 RX ORDER — TRIAMCINOLONE ACETONIDE 40 MG/ML
INJECTION, SUSPENSION INTRA-ARTICULAR; INTRAMUSCULAR AS NEEDED
Status: DISCONTINUED | OUTPATIENT
Start: 2024-10-09 | End: 2024-10-09 | Stop reason: HOSPADM

## 2024-10-09 RX ORDER — HYDROCODONE BITARTRATE AND ACETAMINOPHEN 5; 325 MG/1; MG/1
1 TABLET ORAL EVERY 6 HOURS PRN
Qty: 12 TABLET | Refills: 0 | Status: SHIPPED | OUTPATIENT
Start: 2024-10-09 | End: 2024-10-12

## 2024-10-09 RX ORDER — BUPIVACAINE HYDROCHLORIDE 5 MG/ML
INJECTION, SOLUTION PERINEURAL AS NEEDED
Status: DISCONTINUED | OUTPATIENT
Start: 2024-10-09 | End: 2024-10-09 | Stop reason: HOSPADM

## 2024-10-09 RX ORDER — LIDOCAINE HYDROCHLORIDE 10 MG/ML
INJECTION, SOLUTION INFILTRATION; PERINEURAL AS NEEDED
Status: DISCONTINUED | OUTPATIENT
Start: 2024-10-09 | End: 2024-10-09 | Stop reason: HOSPADM

## 2024-10-09 ASSESSMENT — PAIN - FUNCTIONAL ASSESSMENT
PAIN_FUNCTIONAL_ASSESSMENT: 0-10
PAIN_FUNCTIONAL_ASSESSMENT: 0-10

## 2024-10-09 ASSESSMENT — PAIN SCALES - GENERAL
PAINLEVEL_OUTOF10: 0 - NO PAIN
PAINLEVEL_OUTOF10: 0 - NO PAIN

## 2024-10-09 ASSESSMENT — COLUMBIA-SUICIDE SEVERITY RATING SCALE - C-SSRS
1. IN THE PAST MONTH, HAVE YOU WISHED YOU WERE DEAD OR WISHED YOU COULD GO TO SLEEP AND NOT WAKE UP?: NO
2. HAVE YOU ACTUALLY HAD ANY THOUGHTS OF KILLING YOURSELF?: NO

## 2024-10-09 NOTE — H&P
[facility] Department of Orthopaedic Surgery   Surgical History & Physical >30 Days    Reason for Surgery: left carpal tunnel syndrome  Planned Procedure: left carpal tunnel release, left CMC joint corticosteroid injection    History & Physical Reviewed:  Ciaran Maciel Jr is a 79 y.o. male presenting with the above symptoms. This patient was evaluated as an outpatient, and a plan was made for operative management. Risks and benefits were discussed, and the patient and/or caregivers elected to proceed. The patient presents for the above listed procedure today.     Past Medical History:   Diagnosis Date    Acute embolism and thrombosis of unspecified deep veins of unspecified lower extremity (Multi) 08/23/2013    Deep vein thrombosis of lower extremity    Age-related nuclear cataract, bilateral     Body mass index (BMI) 29.0-29.9, adult     BMI 29.0-29.9,adult    Open angle with borderline findings, low risk, bilateral     Personal history of colonic polyps 05/19/2014    History of colonic polyps    Personal history of other diseases of the digestive system     History of hemorrhoids    Personal history of other endocrine, nutritional and metabolic disease     History of [place]'s disease    Personal history of other infectious and parasitic diseases     History of measles    Refractive error     Rhabdomyolysis 12/13/2017    Rhabdomyolysis due to statin therapy     Past Surgical History:   Procedure Laterality Date    ELBOW SURGERY  05/19/2014    Elbow Surgery    HEMORRHOID SURGERY  05/28/2021    Hemorrhoidectomy    HERNIA REPAIR  05/28/2021    Hernia Repair    KNEE ARTHROSCOPY W/ DEBRIDEMENT  05/28/2021    Knee Arthroscopy (Therapeutic)    MR HEAD ANGIO WO IV CONTRAST  12/28/2022    MR HEAD ANGIO WO IV CONTRAST 12/28/2022 [facility]    MR NECK ANGIO WO IV CONTRAST  12/28/2022    MR NECK ANGIO WO IV CONTRAST 12/28/2022 [facility]    OTHER SURGICAL HISTORY  05/19/2014     Interruption Inferior Vena Cava [place] Filter Placement    OTHER SURGICAL HISTORY  05/28/2021    Extensor tendon repair    OTHER SURGICAL HISTORY  05/28/2021    Vasectomy    OTHER SURGICAL HISTORY  05/28/2021    Trigger finger repair    OTHER SURGICAL HISTORY  05/28/2021    Knee replacement    OTHER SURGICAL HISTORY  05/28/2021    Biopsy Muscle    OTHER SURGICAL HISTORY  11/09/2015    Leg Incision And Drainage    OTHER SURGICAL HISTORY  01/07/2019    Colonoscopy    TONSILLECTOMY  05/28/2021    Tonsillectomy     Social History     Tobacco Use    Smoking status: Never    Smokeless tobacco: Never   Substance Use Topics    Alcohol use: Not Currently     Prior to Admission medications    Medication Sig Start Date End Date Taking? Authorizing Provider   acetaminophen (Tylenol) 500 mg tablet Take 2 tablets (1,000 mg) by mouth every 8 hours if needed (pain).    Historical Provider, MD   aspirin 325 mg tablet Take 1 tablet (325 mg) by mouth once daily.    Historical Provider, MD   cephalexin (Keftab) 500 mg tablet Take by mouth. For Dental procedures only    Historical Provider, MD   cetirizine (ZyrTEC) 10 mg tablet Take 1 tablet (10 mg) by mouth once daily. 10/31/06   Historical Provider, MD   cholecalciferol (Vitamin D-3) 25 MCG (1000 UT) tablet Take 1 tablet (25 mcg) by mouth once daily. 5/22/17   Historical Provider, MD   cholecalciferol (Vitamin D3) 1,250 mcg (50,000 unit) tablet Take by mouth every 30 (thirty) days.    Historical Provider, MD   clopidogrel (Plavix) 75 mg tablet Take 1 tablet (75 mg) by mouth once daily.    Historical Provider, MD   colestipol (Colestid) 1 gram tablet Take by mouth twice a day.    Historical Provider, MD   donepezil (Aricept) 5 mg tablet Take 1 tablet (5 mg) by mouth once daily at bedtime. 7/3/24   Amy Johnston, APRN-CNP   doxazosin (Cardura) 4 mg tablet Take 2 tablets (8 mg) by mouth once daily. 6/11/10   Historical Provider, MD   ezetimibe (Zetia) 10 mg tablet Take 1 tablet  (10 mg) by mouth once daily. 11/2/21   Historical Provider, MD   fludrocortisone (Florinef) 0.1 mg tablet Take 1 tablet (0.1 mg) by mouth once daily in the morning. Take before meals. 8/18/22   Historical Provider, MD   metoprolol tartrate (Lopressor) 25 mg tablet Take 1 tablet (25 mg) by mouth once daily. 6/24/22   Historical Provider, MD   midodrine (Proamatine) 5 mg tablet Take 1 tablet (5 mg) by mouth 2 times a day.    Historical Provider, MD   multivitamin tablet Take 1 tablet by mouth once daily. 6/11/10   Historical Provider, MD   naproxen (Naprosyn) 500 mg tablet     Historical Provider, MD   omega 3-dha-epa-fish oil (Fish OiL) 1,200 (144-216) mg capsule Take 3 capsules (3,600 mg) by mouth once daily. 5/22/17   Historical Provider, MD   rivaroxaban (Xarelto) 20 mg tablet Take 1 tablet (20 mg) by mouth once daily.    Historical Provider, MD   tamsulosin (Flomax) 0.4 mg 24 hr capsule TAKE ONE CAPSULE BY MOUTH ONCE DAILY 9/9/24   Gerri Sharma, DO   TRAZODONE HCL ER PO Take 25 mg by mouth once daily.    Historical Provider, MD   venlafaxine (Effexor) 37.5 mg tablet Take 2 tablets (75 mg) by mouth once daily.    Historical Provider, MD     Allergies   Allergen Reactions    Aspirin-Dipyridamole Other    Statins-Hmg-Coa Reductase Inhibitors Other and Myalgia       Review of Systems:   Gen: Denies recent weight loss  Neuro: Denies recent confusion  Ophtho: Denies changes in vision  ENT: Denies changes in hearing  Endo: Denies weight loss/weight gain  CV: Denies chest pain  Resp: Denies shortness of breath  GI: Denies melena/hematochezia  : Denies painful urination  MSK: Per above HPI  Heme: No abnormal bleeding  Psych: Denies hallucinations    Physical Exam:  - Constitutional: No acute distress, cooperative  - Eyes: EOM grossly intact  - Head/Neck: Trachea midline  - Respiratory/Thorax: Normal work of breathing  - Cardiovascular: RRR on peripheral palpation  - Gastrointestinal: Nondistended  - Psychological:  Appropriate mood/behavior  - Skin: Warm and dry. Additional findings in musculoskeletal evaluation  - Musculoskeletal: Moves all extremities    ERAS patient?: No    COVID-19 Risk Consent:   Surgeon has reviewed the key risks related to aminta COVID-19 and subsequent sequelae.       Chino Alcazar MD   Orthopaedic Surgery PGY-2

## 2024-10-09 NOTE — DISCHARGE INSTRUCTIONS
Post-Operative Instructions  Dr. Estrada Moya [phone]    Dressing:  You have a bandage or splint covering your operative site.    You may remove the dressing in 4  days following surgery. Once your dressing is removed you may shower and/or wash your incision in a sink with soap and water. Do not soak your incision. No bath tubs, hot tubs or swimming pools. After washing the wound please pat the incision dry thoroughly. Please use mild soap. Please do not use hydrogen peroxide. Please cover the wound with a band-aid or gauze and change it daily. Please do not apply any salves, creams, lotions or ointments to the surgical incision. Otherwise keep incision clean and dry (no yard work, engine work, etc.)    Post Anesthesia Instructions:  If you received general anesthesia or IV sedation for your procedure for the next 24 hours: No driving, no drinking alcohol, no sleeping aids, no important decision making, and have an adult with you for 24 hours.    Showering:  You may shower following surgery but please adhere to above instructions regarding the dressing. If showering with bandage/splint in place please ensure that it is kept dry and covered while bathing. There are commercially available cast bags that can be used to protect your dressing while showering. If using garbage bags please make sure that there are no holes in the bag and that the bag has been sealed above the dressing. If the bandage gets wet you must contact your surgeon's office to make arrangements to be seen to have the bandage changed.     Ice/Elevation:  The application of ice to your surgical site after surgery will help with pain control and swelling. This can be very effective for 48-72 hours after surgery. Please be careful to avoid getting bandage wet from a leaky ice bag. Please be advised that the ice may need to be applied for longer periods of time for the cooling effect to penetrate the post-operative dressing.     Elevation of the  operative site above the level of the heart as much as possible for the first 48-72 hours after surgery. Use your sling if you have been provided one while standing or walking. If your fingers are not included in the dressing you are encouraged to attempt finger range of motion as this will help with your hand swelling and ultimate recovery.    Pain Medication:  If you received a regional anesthetic on the day of your surgery your arm and hand may be numb for up to 24 hours after surgery. It is important to wear your sling while the block is still effective in order to protect your arm. It is advisable to take pain medications prior to going to sleep in case the regional anesthesia medication wears off while you are sleeping.    Take your pain medications as directed. Narcotic pain medications can cause lethargy, nausea and constipation. Please contact your surgeon's office and discontinue the medication if these symptoms become problematic. Eating a regular diet, drinking fluids and walking can help with constipation from these medications. Avoid alcohol consumption and driving while taking narcotic pain medications.     Additional pain control options:     You are encouraged to take over the counter medications like Advil / Motrin / Ibuprofen / Aleve in addition to your prescribed pain medications after surgery.    Smoking/Tobacco:  Tobacco use is known to interfere with wound and fracture healing and increase post-operative pain. It can also increase your risk of poor outcomes following surgery. Please do not use tobacco or nicotine products following your surgery. This includes smokeless tobacco, or nicotine replacement products (patches, gum, etc.).    Driving:  It is not advisable to operate a vehicle while using narcotic pain medications. Additionally, please be advised that you are likely to have challenges operating a car or motorcycle while you are still in your postoperative dressing and this could  increase your risk of being involved in an accident and being cited for driving while physically impaired.     Warning Signs:  Observe your arm/hand and incision site (if visible) for increased redness, inflammation, drainage, odor or pain that is unrelieved by rest, elevation or medication. Please contact your surgeon's office immediately if you develop any of these issues or if you develop a fever greater than 101°.    Follow Up Appointments:  Your post-operative appointment has been scheduled for 10/24/24 at 10:30 am    [facility], [address], [place], [place], [address]

## 2024-10-09 NOTE — OP NOTE
"Left carpal tunnel release, left thumb CMC joint corticosteroid injection / 25 Minutes (L) Operative Note     Date: 10/9/2024  OR Location: [facility]    Name: Ciaran Maciel Jr \"Randolph\", : 1945, Age: 79 y.o., MRN: [id number], Sex: male    Diagnosis  Pre-op Diagnosis      * Carpal tunnel syndrome on left [G56.02]     * Arthritis of carpometacarpal (CMC) joint of left thumb [M18.12] Post-op Diagnosis     * Carpal tunnel syndrome on left [G56.02]     * Arthritis of carpometacarpal (CMC) joint of left thumb [M18.12]     Procedures  #1 left carpal tunnel release, #2 left thumb CMC joint corticosteroid injection    Surgeons      * Estrada Moya - Primary    Resident/Fellow/Other Assistant:  Surgeons and Role:  * No surgeons found with a matching role *    Procedure Summary  Anesthesia: Anesthesia type not filed in the log.  ASA: ASA status not filed in the log.  Anesthesia Staff: No anesthesia staff entered.  Estimated Blood Loss: 0 mL  Intra-op Medications:   Administrations occurring from 1245 to 1345 on 10/09/24:   Medication Name Total Dose   lidocaine (Xylocaine) 10 mg/mL (1 %) injection 5 mL   BUPivacaine HCl (Marcaine) 0.5 % (5 mg/mL) injection 5 mL   triamcinolone acetonide (Kenalog-40) injection 20 mg   BUPivacaine HCl (Marcaine) 0.5 % (5 mg/mL) injection 0.5 mL         Intraprocedure I/O Totals       None           Specimen: No specimens collected     Staff:   Circulator: Ying Vick Person: Johanne  Circulator: Margot         Drains and/or Catheters: * None in log *    Tourniquet Times:     Total Tourniquet Time Documented:  Arm - Upper (Left) - 5 minutes  Total: Arm - Upper (Left) - 5 minutes      Implants:     Findings: Severe carpal tunnel syndrome, left thumb CMC arthritis    Indications: Randolph Maciel Jr is an 79 y.o. male who is having surgery for Carpal tunnel syndrome on left [G56.02].      The patient was seen in the preoperative area. The risks, benefits, complications, treatment options, " non-operative alternatives, expected recovery and outcomes were discussed with the patient. The possibilities of reaction to medication, pulmonary aspiration, injury to surrounding structures, bleeding, recurrent infection, the need for additional procedures, failure to diagnose a condition, and creating a complication requiring transfusion or operation were discussed with the patient. The patient concurred with the proposed plan, giving informed consent.  The site of surgery was properly noted/marked if necessary per policy. The patient has been actively warmed in preoperative area. Preoperative antibiotics are not indicated. Venous thrombosis prophylaxis are not indicated.    Procedure Details:    79-year-old gentleman with longstanding symptomatic arthritis of the left thumb CMC joint and carpal tunnel syndrome presents today for left carpal tunnel release and thumb CMC joint corticosteroid injection.  Preoperatively both sites were identified and marked for surgery.  Informed consent process was completed.    Patient was brought to the operating and placed supine on the operating table.  A timeout procedure was performed to verify the correct patient procedure and operative site.  Using aseptic technique the left thumb CMC joint was injected with 20 mg of triamcinolone and 1/2 cc of 0.5% Marcaine.  Local anesthetic was then infiltrated around the base of the left palm.  Left upper extremity was prepped and draped in usual sterile fashion.  Limb was exsanguinated and tourniquet was inflated to 250 mmHg.    We made a longitudinal incision the central aspect of the proximal palm.  Sharp dissection carried through the superficial palmar fascia.  Transverse carpal ligament was exposed and divided longitudinally along its ulnar margin.  Complete median nerve decompression was confirmed.  Nerve hyperemia was very clearly demonstrated following nerve decompression.  Wound was irrigated and closed interrupted fashion.  A  sterile bandage was applied and the tourniquet was deflated.  The patient was transferred to the recovery in stable condition.    Postoperatively the patient will be discharged home once comfortable.  He can remove his bandage and postop day #4 and begin wound care with gentle activities as instructed.  Return to clinic in 2 weeks for wound check and advancement of activities.        Complications:  None; patient tolerated the procedure well.    Disposition: PACU - hemodynamically stable.  Condition: stable         Additional Details:      Attending Attestation: I was present and scrubbed for the entire procedure.    Estrada Moya  Phone Number: [phone]

## 2024-10-10 ASSESSMENT — PAIN SCALES - GENERAL: PAINLEVEL_OUTOF10: 3

## 2024-10-23 NOTE — PROGRESS NOTES
Hand and Upper Extremity Service  Post Operative visit         Date of surgery: 10/9/24    Surgery(s) performed: Left carpal tunnel release        Subjective report: First postoperative visit. Overall doing great and feels much better than before surgery.        Exam findings: Left hand reveals well healed surgical incision proximal palm. Resolving echymosis in the forearm and dorsal hand. Full digital range of motion. Sensation intact to light touch.        Radiograph findings: No new images obtained       Impression: Left carpal tunnel syndrome and thumb CMC arthritis       Plan: He's familiar with the postoperative course after going through the same procedure on the right hand. I have no restrictions for him. He can progressively return to activities as tolerated.         Follow Up: As needed            Estrada Moya MD    [facility] [employer]  Department of Orthopaedic Surgery  Chief of Hand and Upper Extremity Surgery      Scribe Attestation  By signing my name below, IMilvia Scribe   attest that this documentation has been prepared under the direction and in the presence of Dr. Estrada Moya.      Dictation performed with the use of voice recognition software. Syntax and grammatical errors may exist.

## 2024-10-24 ENCOUNTER — OFFICE VISIT (OUTPATIENT)
Dept: ORTHOPEDIC SURGERY | Facility: CLINIC | Age: 79
End: 2024-10-24
Payer: MEDICARE

## 2024-10-24 VITALS — BODY MASS INDEX: 30.2 KG/M2 | HEIGHT: 72 IN | WEIGHT: 223 LBS

## 2024-10-24 DIAGNOSIS — G56.02 CARPAL TUNNEL SYNDROME ON LEFT: Primary | ICD-10-CM

## 2024-10-24 PROCEDURE — 1159F MED LIST DOCD IN RCRD: CPT | Performed by: ORTHOPAEDIC SURGERY

## 2024-10-24 PROCEDURE — 1157F ADVNC CARE PLAN IN RCRD: CPT | Performed by: ORTHOPAEDIC SURGERY

## 2024-10-24 PROCEDURE — 1036F TOBACCO NON-USER: CPT | Performed by: ORTHOPAEDIC SURGERY

## 2024-10-24 PROCEDURE — 99211 OFF/OP EST MAY X REQ PHY/QHP: CPT | Performed by: ORTHOPAEDIC SURGERY

## 2024-10-24 PROCEDURE — 1125F AMNT PAIN NOTED PAIN PRSNT: CPT | Performed by: ORTHOPAEDIC SURGERY

## 2024-10-24 ASSESSMENT — PAIN SCALES - GENERAL: PAINLEVEL_OUTOF10: 3

## 2024-10-24 ASSESSMENT — PAIN DESCRIPTION - DESCRIPTORS: DESCRIPTORS: SORE

## 2024-10-24 ASSESSMENT — PAIN - FUNCTIONAL ASSESSMENT: PAIN_FUNCTIONAL_ASSESSMENT: 0-10

## 2024-12-16 DIAGNOSIS — N40.0 BENIGN PROSTATIC HYPERPLASIA WITHOUT LOWER URINARY TRACT SYMPTOMS: ICD-10-CM

## 2024-12-16 RX ORDER — TAMSULOSIN HYDROCHLORIDE 0.4 MG/1
0.4 CAPSULE ORAL DAILY
Qty: 90 CAPSULE | Refills: 1 | Status: SHIPPED | OUTPATIENT
Start: 2024-12-16

## 2025-01-24 ENCOUNTER — APPOINTMENT (OUTPATIENT)
Dept: OTOLARYNGOLOGY | Facility: CLINIC | Age: 80
End: 2025-01-24
Payer: MEDICARE

## 2025-02-07 ENCOUNTER — APPOINTMENT (OUTPATIENT)
Facility: CLINIC | Age: 80
End: 2025-02-07
Payer: MEDICARE

## 2025-02-07 VITALS — BODY MASS INDEX: 31.56 KG/M2 | WEIGHT: 233 LBS | HEIGHT: 72 IN

## 2025-02-07 DIAGNOSIS — C43.39 MALIGNANT MELANOMA OF OTHER PARTS OF FACE (MULTI): Primary | ICD-10-CM

## 2025-02-07 PROCEDURE — 1126F AMNT PAIN NOTED NONE PRSNT: CPT | Performed by: OTOLARYNGOLOGY

## 2025-02-07 PROCEDURE — 1157F ADVNC CARE PLAN IN RCRD: CPT | Performed by: OTOLARYNGOLOGY

## 2025-02-07 PROCEDURE — 1159F MED LIST DOCD IN RCRD: CPT | Performed by: OTOLARYNGOLOGY

## 2025-02-07 PROCEDURE — 99213 OFFICE O/P EST LOW 20 MIN: CPT | Performed by: OTOLARYNGOLOGY

## 2025-02-07 PROCEDURE — 1160F RVW MEDS BY RX/DR IN RCRD: CPT | Performed by: OTOLARYNGOLOGY

## 2025-02-07 PROCEDURE — 1036F TOBACCO NON-USER: CPT | Performed by: OTOLARYNGOLOGY

## 2025-02-07 ASSESSMENT — PAIN SCALES - GENERAL: PAINLEVEL_OUTOF10: 0-NO PAIN

## 2025-02-07 NOTE — PROGRESS NOTES
"ENT Follow up Visit    History Of Present Illness  Ciaran Maciel Jr \"Randolph\" is a 80 y.o. male presents for follow up      s/p wle left face and SLNB. negative margins and negative nodes. Stage 1b     doing well, has not noticed any new skin lesions or masses in the neck. He has seen dermatology and will be seen every 6 months    He did notice a small firm area on the inside of his lower lip on the left-hand side.  Is not causing the discomfort.  It tends to fluctuate in size     Past Medical History  He has a past medical history of Acute embolism and thrombosis of unspecified deep veins of unspecified lower extremity (Multi) (08/23/2013), Age-related nuclear cataract, bilateral, Body mass index (BMI) 29.0-29.9, adult, Open angle with borderline findings, low risk, bilateral, Personal history of colonic polyps (05/19/2014), Personal history of other diseases of the digestive system, Personal history of other endocrine, nutritional and metabolic disease, Personal history of other infectious and parasitic diseases, Refractive error, and Rhabdomyolysis (12/13/2017).    Surgical History  He has a past surgical history that includes Elbow surgery (05/19/2014); Other surgical history (05/19/2014); Other surgical history (05/28/2021); Other surgical history (05/28/2021); Other surgical history (05/28/2021); Other surgical history (05/28/2021); Other surgical history (05/28/2021); Tonsillectomy (05/28/2021); Hemorrhoid surgery (05/28/2021); Hernia repair (05/28/2021); Knee arthroscopy w/ debridement (05/28/2021); Other surgical history (11/09/2015); Other surgical history (01/07/2019); MR angio head wo IV contrast (12/28/2022); and MR angio neck wo IV contrast (12/28/2022).     Social History  He reports that he has never smoked. He has never used smokeless tobacco. He reports that he does not currently use alcohol. He reports that he does not use drugs.    Family History  No family history on file.   "   Allergies  Aspirin-dipyridamole and Statins-hmg-coa reductase inhibitors     Physical Exam:  CONSTITUTIONAL:  No acute distress  VOICE:  No hoarseness or other abnormality  RESPIRATION:  Breathing comfortably, no stridor  CV:  No clubbing/cyanosis/edema in hands  EYES:  EOM intact, sclera normal  NEURO:  Alert and oriented times 3, Cranial nerves II-XII grossly intact and symmetric bilaterally  HEAD AND FACE:  Symmetric facial features, no masses or lesions, sinuses non-tender to palpation  SALIVARY GLANDS:  Parotid and submandibular glands normal bilaterally  EARS:  Normal external ears, external auditory canals, and TMs to otoscopy, normal hearing to whispered voice.  NOSE:  External nose midline, anterior rhinoscopy is normal with limited visualization to the anterior aspect of the interior turbinates, no bleeding or drainage, no lesions  ORAL CAVITY/OROPHARYNX/LIPS:  Normal mucous membranes, normal floor of mouth/tongue/OP, there are no mucosal changes.  There is a small mobile mass which clinically looks like a slightly enlarged minor salivary gland on the left lower lip  PHARYNGEAL WALLS:  No masses or lesions  NECK/LYMPH:  No LAD, no thyroid masses, trachea midline  SKIN:  prior incisions well healed  PSYCH:  Alert and oriented with appropriate mood and affect         Last Recorded Vitals  Height 1.829 m (6'), weight 106 kg (233 lb).    Assessment and Plan  80 y.o. malereferred for melanoma with 1.6 mm depth s/p WLE and SLNB (6/2021). Margins and nodes negative. Stage 1b. Close observation recommended at tumor board     -f/u in 1 year  -continue to see dermatology as scheduled  -Follow-up earlier with any new skin lesions or masses in the head neck.  We also discussed that he should follow-up earlier if he notices any pain, increase in size, or any new symptoms with the left lower lip    Juan Antonio Covarrubias MD

## 2025-02-26 ENCOUNTER — PATIENT MESSAGE (OUTPATIENT)
Dept: PRIMARY CARE | Facility: CLINIC | Age: 80
End: 2025-02-26
Payer: MEDICARE

## 2025-03-06 ENCOUNTER — APPOINTMENT (OUTPATIENT)
Dept: PRIMARY CARE | Facility: CLINIC | Age: 80
End: 2025-03-06
Payer: MEDICARE

## 2025-03-26 DIAGNOSIS — R41.3 MEMORY CHANGES: ICD-10-CM

## 2025-03-26 RX ORDER — DONEPEZIL HYDROCHLORIDE 5 MG/1
5 TABLET, FILM COATED ORAL NIGHTLY
Qty: 90 TABLET | Refills: 3 | Status: SHIPPED | OUTPATIENT
Start: 2025-03-26

## 2025-04-08 ENCOUNTER — APPOINTMENT (OUTPATIENT)
Dept: PRIMARY CARE | Facility: CLINIC | Age: 80
End: 2025-04-08
Payer: MEDICARE

## 2025-04-08 VITALS
BODY MASS INDEX: 31.15 KG/M2 | SYSTOLIC BLOOD PRESSURE: 128 MMHG | HEIGHT: 72 IN | WEIGHT: 230 LBS | DIASTOLIC BLOOD PRESSURE: 70 MMHG | OXYGEN SATURATION: 95 % | HEART RATE: 58 BPM

## 2025-04-08 DIAGNOSIS — T78.40XS ALLERGY, SEQUELA: Primary | Chronic | ICD-10-CM

## 2025-04-08 DIAGNOSIS — I48.91 ATRIAL FIBRILLATION, UNSPECIFIED TYPE (MULTI): Chronic | ICD-10-CM

## 2025-04-08 DIAGNOSIS — R06.09 DYSPNEA ON EXERTION: ICD-10-CM

## 2025-04-08 DIAGNOSIS — I95.1 ORTHOSTATIC SYNCOPE: Chronic | ICD-10-CM

## 2025-04-08 DIAGNOSIS — R29.6 FREQUENT FALLS: ICD-10-CM

## 2025-04-08 DIAGNOSIS — R41.3 MEMORY CHANGES: ICD-10-CM

## 2025-04-08 DIAGNOSIS — R53.81 PHYSICAL DECONDITIONING: ICD-10-CM

## 2025-04-08 PROCEDURE — 1157F ADVNC CARE PLAN IN RCRD: CPT | Performed by: STUDENT IN AN ORGANIZED HEALTH CARE EDUCATION/TRAINING PROGRAM

## 2025-04-08 PROCEDURE — G2211 COMPLEX E/M VISIT ADD ON: HCPCS | Performed by: STUDENT IN AN ORGANIZED HEALTH CARE EDUCATION/TRAINING PROGRAM

## 2025-04-08 PROCEDURE — 1160F RVW MEDS BY RX/DR IN RCRD: CPT | Performed by: STUDENT IN AN ORGANIZED HEALTH CARE EDUCATION/TRAINING PROGRAM

## 2025-04-08 PROCEDURE — 96372 THER/PROPH/DIAG INJ SC/IM: CPT | Performed by: STUDENT IN AN ORGANIZED HEALTH CARE EDUCATION/TRAINING PROGRAM

## 2025-04-08 PROCEDURE — 3074F SYST BP LT 130 MM HG: CPT | Performed by: STUDENT IN AN ORGANIZED HEALTH CARE EDUCATION/TRAINING PROGRAM

## 2025-04-08 PROCEDURE — 3078F DIAST BP <80 MM HG: CPT | Performed by: STUDENT IN AN ORGANIZED HEALTH CARE EDUCATION/TRAINING PROGRAM

## 2025-04-08 PROCEDURE — 99213 OFFICE O/P EST LOW 20 MIN: CPT | Performed by: STUDENT IN AN ORGANIZED HEALTH CARE EDUCATION/TRAINING PROGRAM

## 2025-04-08 PROCEDURE — 1159F MED LIST DOCD IN RCRD: CPT | Performed by: STUDENT IN AN ORGANIZED HEALTH CARE EDUCATION/TRAINING PROGRAM

## 2025-04-08 RX ORDER — TRIAMCINOLONE ACETONIDE 40 MG/ML
40 INJECTION, SUSPENSION INTRA-ARTICULAR; INTRAMUSCULAR ONCE
Status: COMPLETED | OUTPATIENT
Start: 2025-04-08 | End: 2025-04-08

## 2025-04-08 RX ADMIN — TRIAMCINOLONE ACETONIDE 40 MG: 40 INJECTION, SUSPENSION INTRA-ARTICULAR; INTRAMUSCULAR at 11:40

## 2025-04-08 ASSESSMENT — ENCOUNTER SYMPTOMS
PSYCHIATRIC NEGATIVE: 1
NEUROLOGICAL NEGATIVE: 1
FATIGUE: 1
SHORTNESS OF BREATH: 1
GASTROINTESTINAL NEGATIVE: 1
MUSCULOSKELETAL NEGATIVE: 1

## 2025-04-08 NOTE — PROGRESS NOTES
"Subjective   Patient ID: Ciaran Maciel Jr \"Randolph\" is a 80 y.o. male who presents for Follow-up (Follow up /Requesting a kenalog shot).  Feeling very tired. States he was told there is nothing further cardiac that can be done. Was told that cardiac rehab was unlikely to be helpful for him. He takes frequent naps during the day.     Has ordered compression stockings. Planning to start wearing these.     Gets short of breath easily.     Appetite is good. Digestion stable.     Denies headaches.     No other concerns today.         Review of Systems   Constitutional:  Positive for fatigue.   HENT:  Positive for congestion.    Respiratory:  Positive for shortness of breath.    Cardiovascular:  Positive for leg swelling.   Gastrointestinal: Negative.    Musculoskeletal: Negative.    Neurological: Negative.    Psychiatric/Behavioral: Negative.     All other systems reviewed and are negative.      Objective   Physical Exam  Vitals reviewed.   Constitutional:       Appearance: Normal appearance.   HENT:      Head: Normocephalic.      Mouth/Throat:      Mouth: Mucous membranes are moist.   Eyes:      Pupils: Pupils are equal, round, and reactive to light.   Cardiovascular:      Rate and Rhythm: Normal rate.   Pulmonary:      Effort: Pulmonary effort is normal. No respiratory distress.      Breath sounds: Normal breath sounds. No wheezing or rhonchi.   Musculoskeletal:         General: Normal range of motion.   Skin:     General: Skin is warm and dry.   Neurological:      General: No focal deficit present.      Mental Status: He is alert. Mental status is at baseline.   Psychiatric:         Mood and Affect: Mood normal.         Behavior: Behavior normal.         Body mass index is 31.19 kg/m².      Current Outpatient Medications:     acetaminophen (Tylenol) 500 mg tablet, Take 2 tablets (1,000 mg) by mouth every 8 hours if needed (pain)., Disp: , Rfl:     aspirin 325 mg tablet, Take 1 tablet (325 mg) by mouth once daily., " Disp: , Rfl:     cephalexin (Keftab) 500 mg tablet, Take by mouth. For Dental procedures only, Disp: , Rfl:     cetirizine (ZyrTEC) 10 mg tablet, Take 1 tablet (10 mg) by mouth once daily., Disp: , Rfl:     cholecalciferol (Vitamin D-3) 25 MCG (1000 UT) tablet, Take 1 tablet (25 mcg) by mouth once daily., Disp: , Rfl:     cholecalciferol (Vitamin D3) 1,250 mcg (50,000 unit) tablet, Take by mouth every 30 (thirty) days., Disp: , Rfl:     clopidogrel (Plavix) 75 mg tablet, Take 1 tablet (75 mg) by mouth once daily., Disp: , Rfl:     colestipol (Colestid) 1 gram tablet, Take by mouth twice a day., Disp: , Rfl:     donepezil (Aricept) 5 mg tablet, Take 1 tablet (5 mg) by mouth once daily at bedtime., Disp: 90 tablet, Rfl: 3    doxazosin (Cardura) 4 mg tablet, Take 2 tablets (8 mg) by mouth once daily., Disp: , Rfl:     ezetimibe (Zetia) 10 mg tablet, Take 1 tablet (10 mg) by mouth once daily., Disp: , Rfl:     fludrocortisone (Florinef) 0.1 mg tablet, Take 1 tablet (0.1 mg) by mouth once daily in the morning. Take before meals., Disp: , Rfl:     metoprolol tartrate (Lopressor) 25 mg tablet, Take 1 tablet (25 mg) by mouth once daily., Disp: , Rfl:     midodrine (Proamatine) 5 mg tablet, Take 1 tablet (5 mg) by mouth 2 times a day., Disp: , Rfl:     multivitamin tablet, Take 1 tablet by mouth once daily., Disp: , Rfl:     naproxen (Naprosyn) 500 mg tablet, , Disp: , Rfl:     omega 3-dha-epa-fish oil (Fish OiL) 1,200 (144-216) mg capsule, Take 3 capsules (3,600 mg) by mouth once daily., Disp: , Rfl:     rivaroxaban (Xarelto) 20 mg tablet, Take 1 tablet (20 mg) by mouth once daily., Disp: , Rfl:     tamsulosin (Flomax) 0.4 mg 24 hr capsule, Take 1 capsule (0.4 mg) by mouth once daily., Disp: 90 capsule, Rfl: 1    TRAZODONE HCL ER PO, Take 25 mg by mouth once daily., Disp: , Rfl:     venlafaxine (Effexor) 37.5 mg tablet, Take 2 tablets (75 mg) by mouth once daily., Disp: , Rfl:       Assessment/Plan   Diagnoses and all  orders for this visit:  Allergy, sequela  Comments:  reports significant relief from keneolog shots every 6 months  Orders:  -     triamcinolone acetonide (Kenalog-40) injection 40 mg  Memory changes  Comments:  doing better with donepezil  Atrial fibrillation, unspecified type (Multi)  Comments:  follows with cardiology  was told nothing further can be done  encouraged activity with breaks as needed  Orthostatic syncope  Comments:  stable on midodrine  Physical deconditioning  Frequent falls  Dyspnea on exertion    Follow up in 6 months or sooner as needed       Gerri Sharma DO 04/09/25 12:47 PM

## 2025-06-12 ENCOUNTER — PATIENT MESSAGE (OUTPATIENT)
Dept: PRIMARY CARE | Facility: CLINIC | Age: 80
End: 2025-06-12
Payer: MEDICARE

## 2025-06-12 DIAGNOSIS — N40.0 BENIGN PROSTATIC HYPERPLASIA WITHOUT LOWER URINARY TRACT SYMPTOMS: ICD-10-CM

## 2025-06-12 RX ORDER — TAMSULOSIN HYDROCHLORIDE 0.4 MG/1
0.4 CAPSULE ORAL DAILY
Qty: 90 CAPSULE | Refills: 1 | Status: SHIPPED | OUTPATIENT
Start: 2025-06-12

## 2025-07-10 ENCOUNTER — TELEPHONE (OUTPATIENT)
Dept: PRIMARY CARE | Facility: CLINIC | Age: 80
End: 2025-07-10
Payer: MEDICARE

## 2025-08-13 ENCOUNTER — APPOINTMENT (OUTPATIENT)
Dept: PRIMARY CARE | Facility: CLINIC | Age: 80
End: 2025-08-13
Payer: MEDICARE

## 2025-08-15 ENCOUNTER — OFFICE VISIT (OUTPATIENT)
Dept: PRIMARY CARE | Facility: CLINIC | Age: 80
End: 2025-08-15
Payer: MEDICARE

## 2025-08-15 VITALS
DIASTOLIC BLOOD PRESSURE: 70 MMHG | BODY MASS INDEX: 30.61 KG/M2 | SYSTOLIC BLOOD PRESSURE: 140 MMHG | WEIGHT: 226 LBS | OXYGEN SATURATION: 95 % | HEART RATE: 67 BPM | HEIGHT: 72 IN

## 2025-08-15 DIAGNOSIS — R06.09 DYSPNEA ON EXERTION: Primary | ICD-10-CM

## 2025-08-15 DIAGNOSIS — R53.83 OTHER FATIGUE: ICD-10-CM

## 2025-08-15 DIAGNOSIS — Q79.0 BOCHDALEK HERNIA (HHS-HCC): ICD-10-CM

## 2025-08-15 DIAGNOSIS — I25.10 CORONARY ARTERY DISEASE DUE TO LIPID RICH PLAQUE: ICD-10-CM

## 2025-08-15 DIAGNOSIS — I25.83 CORONARY ARTERY DISEASE DUE TO LIPID RICH PLAQUE: ICD-10-CM

## 2025-08-15 ASSESSMENT — PATIENT HEALTH QUESTIONNAIRE - PHQ9
1. LITTLE INTEREST OR PLEASURE IN DOING THINGS: NOT AT ALL
SUM OF ALL RESPONSES TO PHQ9 QUESTIONS 1 AND 2: 0
2. FEELING DOWN, DEPRESSED OR HOPELESS: NOT AT ALL

## 2025-08-23 LAB
CREAT SERPL-MCNC: 0.94 MG/DL (ref 0.7–1.28)
EGFRCR SERPLBLD CKD-EPI 2021: 83 ML/MIN/1.73M2

## 2025-08-25 DIAGNOSIS — Q79.0: Primary | ICD-10-CM

## 2025-08-28 ENCOUNTER — HOSPITAL ENCOUNTER (OUTPATIENT)
Dept: RADIOLOGY | Facility: HOSPITAL | Age: 80
Discharge: HOME | End: 2025-08-28
Payer: MEDICARE

## 2025-08-28 DIAGNOSIS — R06.09 DYSPNEA ON EXERTION: ICD-10-CM

## 2025-08-28 PROCEDURE — 2550000001 HC RX 255 CONTRASTS: Performed by: STUDENT IN AN ORGANIZED HEALTH CARE EDUCATION/TRAINING PROGRAM

## 2025-08-28 PROCEDURE — 71260 CT THORAX DX C+: CPT

## 2025-08-28 RX ADMIN — IOHEXOL 50 ML: 350 INJECTION, SOLUTION INTRAVENOUS at 14:05

## 2025-09-02 ENCOUNTER — TELEPHONE (OUTPATIENT)
Dept: PRIMARY CARE | Facility: CLINIC | Age: 80
End: 2025-09-02
Payer: MEDICARE

## 2025-09-02 DIAGNOSIS — K86.9 LESION OF PANCREAS (HHS-HCC): Primary | ICD-10-CM

## 2025-09-04 ENCOUNTER — APPOINTMENT (OUTPATIENT)
Dept: SURGERY | Facility: CLINIC | Age: 80
End: 2025-09-04
Payer: MEDICARE

## 2025-09-04 ASSESSMENT — ENCOUNTER SYMPTOMS
APPETITE CHANGE: 0
SHORTNESS OF BREATH: 1
COUGH: 0
VOMITING: 0
ACTIVITY CHANGE: 0
CHILLS: 0
NAUSEA: 0
FEVER: 0
ABDOMINAL DISTENTION: 0

## 2025-09-05 ENCOUNTER — APPOINTMENT (OUTPATIENT)
Dept: SURGERY | Facility: CLINIC | Age: 80
End: 2025-09-05
Payer: MEDICARE

## 2025-09-11 ENCOUNTER — APPOINTMENT (OUTPATIENT)
Dept: OPHTHALMOLOGY | Facility: CLINIC | Age: 80
End: 2025-09-11
Payer: MEDICARE

## 2025-10-09 ENCOUNTER — APPOINTMENT (OUTPATIENT)
Dept: PRIMARY CARE | Facility: CLINIC | Age: 80
End: 2025-10-09
Payer: MEDICARE

## 2026-02-06 ENCOUNTER — APPOINTMENT (OUTPATIENT)
Dept: OTOLARYNGOLOGY | Facility: CLINIC | Age: 81
End: 2026-02-06
Payer: MEDICARE

## 2026-02-06 ENCOUNTER — APPOINTMENT (OUTPATIENT)
Facility: CLINIC | Age: 81
End: 2026-02-06
Payer: MEDICARE

## (undated) DEVICE — GLOVE, SURGICAL, PROTEXIS PI BLUE W/NEUTHERA, 8.0, PF, LF

## (undated) DEVICE — GLOVE, SURGICAL, PROTEXIS PI , 7.5, PF, LF

## (undated) DEVICE — PADDING, WEBRIL, UNDERCAST, STERILE, 2IN

## (undated) DEVICE — Device

## (undated) DEVICE — DRESSING, GAUZE, FLUFF, 1 PLY, 18 X 36 IN

## (undated) DEVICE — DRESSING, ABDOMINAL PAD, CURITY, 8 X 10 IN

## (undated) DEVICE — ADHESIVE, SKIN, DERMABOND ADVANCED, 15CM, PEN-STYLE

## (undated) DEVICE — BANDAGE, ELASTIC, ACE, SELF-CLOSURE, 2 IN X 5 YD, STERILE

## (undated) DEVICE — SUTURE, MONOCRYL, 4-0, 18 IN, PS2, UNDYED

## (undated) DEVICE — BLADE, ARTHRO-LOK, MINI-MENISCUS, FLAT, 4 MM